# Patient Record
Sex: FEMALE | Race: WHITE | NOT HISPANIC OR LATINO | Employment: OTHER | URBAN - METROPOLITAN AREA
[De-identification: names, ages, dates, MRNs, and addresses within clinical notes are randomized per-mention and may not be internally consistent; named-entity substitution may affect disease eponyms.]

---

## 2017-03-30 ENCOUNTER — RADIANT APPOINTMENT (OUTPATIENT)
Dept: MAMMOGRAPHY | Facility: CLINIC | Age: 82
End: 2017-03-30
Attending: PHYSICIAN ASSISTANT

## 2017-03-30 ENCOUNTER — ONCOLOGY VISIT (OUTPATIENT)
Dept: ONCOLOGY | Facility: CLINIC | Age: 82
End: 2017-03-30
Attending: PHYSICIAN ASSISTANT
Payer: COMMERCIAL

## 2017-03-30 VITALS
OXYGEN SATURATION: 95 % | DIASTOLIC BLOOD PRESSURE: 74 MMHG | SYSTOLIC BLOOD PRESSURE: 134 MMHG | HEART RATE: 60 BPM | TEMPERATURE: 98.7 F

## 2017-03-30 DIAGNOSIS — C50.912 MALIGNANT NEOPLASM OF LEFT FEMALE BREAST, UNSPECIFIED SITE OF BREAST: Primary | ICD-10-CM

## 2017-03-30 DIAGNOSIS — N63.20 LEFT BREAST MASS: ICD-10-CM

## 2017-03-30 DIAGNOSIS — C50.912 MALIGNANT NEOPLASM OF LEFT FEMALE BREAST, UNSPECIFIED SITE OF BREAST: ICD-10-CM

## 2017-03-30 PROCEDURE — 99212 OFFICE O/P EST SF 10 MIN: CPT | Mod: ZF

## 2017-03-30 PROCEDURE — 99214 OFFICE O/P EST MOD 30 MIN: CPT | Mod: ZP | Performed by: PHYSICIAN ASSISTANT

## 2017-03-30 ASSESSMENT — PAIN SCALES - GENERAL: PAINLEVEL: NO PAIN (0)

## 2017-03-30 NOTE — MR AVS SNAPSHOT
"              After Visit Summary   3/30/2017    Buffy Oconnell    MRN: [id number]           Patient Information     Date Of Birth          1934        Visit Information        Provider Department      3/30/2017 12:30 PM Belkys Che PA-C [facility]        Today's Diagnoses     Malignant neoplasm of left female breast, unspecified site of breast (H)    -  1    Left breast mass           Follow-ups after your visit        Who to contact     If you have questions or need follow up information about today's clinic visit or your schedule please contact [facility] directly at [phone].  Normal or non-critical lab and imaging results will be communicated to you by [employer] teahart, letter or phone within 4 business days after the clinic has received the results. If you do not hear from us within 7 days, please contact the clinic through [employer] te[employer]art or phone. If you have a critical or abnormal lab result, we will notify you by phone as soon as possible.  Submit refill requests through [employer] or call your pharmacy and they will forward the refill request to us. Please allow 3 business days for your refill to be completed.          Additional Information About Your Visit        MyChart Information     [employer] lets you send messages to your doctor, view your test results, renew your prescriptions, schedule appointments and more. To sign up, go to [email or web address] . Click on \"Log in\" on the left side of the screen, which will take you to the Welcome page. Then click on \"Sign up Now\" on the right side of the page.     You will be asked to enter the access code listed below, as well as some personal information. Please follow the directions to create your username and password.     Your access code is: [email or web address]  Expires: 2017  6:30 AM     Your access code will  in 90 days. If you need help or a new code, please call your [place] clinic or [phone].        Care " EveryWhere ID     This is your Care EveryWhere ID. This could be used by other organizations to access your [place] medical records  [id number]        Your Vitals Were     Pulse Temperature Pulse Oximetry             60 98.7  F (37.1  C) (Oral) 95%          Blood Pressure from Last 3 Encounters:   03/30/17 134/74   02/16/16 143/85   02/16/15 128/68    Weight from Last 3 Encounters:   02/16/16 78.1 kg (172 lb 3.2 oz)   02/16/15 79.7 kg (175 lb 11.2 oz)   08/21/14 78.7 kg (173 lb 9.6 oz)               Primary Care Provider Office Phone # Fax #    Nely Sanchez [phone]       [facility] [address] [facility] [address]        Thank you!     Thank you for choosing [facility]  for your care. Our goal is always to provide you with excellent care. Hearing back from our patients is one way we can continue to improve our services. Please take a few minutes to complete the written survey that you may receive in the mail after your visit with us. Thank you!             Your Updated Medication List - Protect others around you: Learn how to safely use, store and throw away your medicines at www.disposemymeds.org.          This list is accurate as of: 3/30/17 11:59 PM.  Always use your most recent med list.                   Brand Name Dispense Instructions for use    aspirin 81 MG tablet      Take 1 tablet by mouth daily.       CALCIUM CITRATE + D PO      Take 2 tablets by mouth daily.       FLUoxetine 10 MG tablet    PROzac     Take 1 tablet by mouth daily. As directed.       losartan 100 MG tablet    COZAAR     Take 100 mg by mouth daily.       metoprolol 50 MG tablet    LOPRESSOR     Take 1 tablet by mouth 3 times daily. For blood pressure.       OMEGA 3 PO      Take 3 capsules by mouth daily.       UNABLE TO FIND      Nutriferon CAPS: 2 capsules daily       VITAMIN D PO      Take tablet -  2000 IU every day.

## 2017-03-30 NOTE — LETTER
3/30/2017    RE: Buffy Oconnell  [address] [address] [facility] [id number]       DIAGNOSIS:  Stage I (T1 N0 M0) infiltrating ductal carcinoma of the left breast, ER/MI negative, HER2 positive.  Ms. Oconnell had a bilateral screening mammogram on 01/03/2007. This noted a mass in her left breast. She had a left diagnostic mammogram with ultrasound on 01/19/2007. Ultrasound revealed a lobulated, hypoechoic mass, 0.9 x 0.9 x 0.8 cm. Biopsy of the left breast on 01/19/2007 revealed infiltrating ductal carcinoma, [place] grade 3. There was associated DCIS. ER/MI was negative. HER2 was positive. Left lumpectomy with sentinel lymph node biopsy was performed on 03/07/2007. This revealed infiltrating ductal carcinoma, 1.2 x 0.9 x 1.1 cm. This was a grade 3. There was associated DCIS. Margins were clear. Zero out of 2 lymph nodes were positive. She was diagnosed with a stage I (T1 N0 M0) left breast cancer. She completed 6 cycles of TCH (Taxotere, carboplatin and Herceptin) from 04/09/2007 until 07/23/2007. She went on to continue with the Herceptin from 08/24/2007 until 02/01/2008. There was a dose held secondary to a declining EF, but this resolved. She completed left breast radiation with 6440 cGy from 08/15/2007 until 10/04/2007.     CANCER TREATMENT SUMMARY:  *Bilateral screening mammogram on 01/03/2007. This noted a mass in her left breast.   *Left diagnostic mammogram with ultrasound on 01/19/2007. Ultrasound revealed a lobulated, hypoechoic mass, 0.9 x 0.9 x 0.8 cm.   *Biopsy of the left breast on 01/19/2007 revealed infiltrating ductal carcinoma, [place] grade 3. There was associated DCIS. ER/MI was negative. HER2 was positive.   *Left lumpectomy with sentinel lymph node biopsy was performed on 03/07/2007. This revealed infiltrating ductal carcinoma, 1.2 x 0.9 x 1.1 cm. This was a grade 3. There was associated DCIS. Margins were clear. Zero out of 2 lymph nodes were positive.   *6 cycles of TCH (Taxotere, carboplatin and  Herceptin) from 04/09/2007 until 07/23/2007.   *She went on to continue with the Herceptin from 08/24/2007 until 02/01/2008. There was a dose held secondary to a declining EF, but this resolved.   *Left breast radiation with 6440 cGy from 08/15/2007 until 10/04/2007.     INTERVAL HISTORY:  Ms. Oconnell comes in to the clinic today for an unscheduled visit secondary to a new onset left breast mass.  She is also due for her yearly followup.  She states that on 03/22 her  was diagnosed with Alzheimer's.  Within a couple of days, she noticed a lump just above her incision on the left breast.  She has noted no change in size since she has noticed the lump.  She does have sensitivity to touch in the area.  There are no other masses noted.  She had her last mammogram about a year ago.  She does seem a bit overwhelmed today given her 's new Alzheimer's diagnosis.  She will begin with an Alzheimer's support group near her home.  She otherwise is eating and drinking well.  She denies any chest pain or shortness of breath.  She has a chronic cough which is unchanged.  She denies any nausea, vomiting, abdominal pain, change of bowel habits, blood in the stools, change in urination, hearing loss or tinnitus.  She continues to have difficulty with her knees bilaterally and gets injections.  Her exercise is limited secondary to the knee pain.     MEDICATIONS:   Current Outpatient Prescriptions   Medication Sig Dispense Refill     losartan (COZAAR) 100 MG tablet Take 100 mg by mouth daily.       UNABLE TO FIND Nutriferon CAPS: 2 capsules daily       Omega-3 Fatty Acids (OMEGA 3 PO) Take 3 capsules by mouth daily.       aspirin 81 MG tablet Take 1 tablet by mouth daily.       Calcium Citrate-Vitamin D (CALCIUM CITRATE + D PO) Take 2 tablets by mouth daily.       FLUoxetine (PROZAC) 10 MG tablet Take 1 tablet by mouth daily. As directed.        metoprolol (LOPRESSOR) 50 MG tablet Take 1 tablet by mouth 3 times daily. For  blood pressure.        VITAMIN D PO Take tablet -  2000 IU every day.            ALLERGIES:    Allergies   Allergen Reactions     Darvocet [Propoxyphene N-Apap]      Darvocet-N 100 tabs     Hydrochlorothiazide W/Triamterene      PN: LW Reaction: HIVES     Macrodantin [Nitrofurantoin]      caps     Sulfa Drugs      Sulfamethoxazole-Trimethoprim      PN: LW Reaction: HIVES       PHYSICAL EXAMINATION:  Vitals: /74  Pulse 60  Temp 98.7  F (37.1  C) (Oral)  SpO2 95%  GENERAL:  A pleasant person in no acute distress.   HEENT:  Sclerae are nonicteric.    NECK:  Supple.   LYMPH NODES:  No peripheral lymphadenopathy noted in the axillary, supraclavicular, or cervical regions.   LUNGS:  Clear to auscultation bilaterally.   HEART:  Regular rate and rhythm, with no murmur appreciated.   ABDOMEN:  Bowel sounds are active.  Soft and nontender.  No hepatosplenomegaly or other masses appreciated.  LOWER EXTREMITIES:  Without pitting edema to the knees bilaterally.   NEUROLOGICAL:  Alert/orientated/able to answer all questions.  CN grossly intact.  BREAST: Right breast is normal to inspection. No dominant masses. Left breast notable for the well healed surgical incision from the 10:00 to 2:00 position near the areola.  Pea sized mass is just superior to the medial aspect of the incision.  No other masses.     RADIOLOGY:  Bilateral mammogram and Left breast US today: preliminary report, mass is fat necrosis.    IMPRESSION/PLAN:   1. Stage I (T1 N0 M0) infiltrating ductal carcinoma of the left breast, ER/OK negative, HER2 positive. She was treated as above with surgery, chemotherapy and radiation.  She has a pea sized mass near the incision of the left breast.  Preliminary breast imaging report shows no concern for cancer, but fat necrosis.  I am awaiting the final report at this time.  She will continue to follow with me in the Cancer Survivorship Program and I will see her on a yearly basis. She had discussions about  mammograms with Dr. Lora and is in agreement to continue obtaining mammograms every other year, given her age of 82.   2. Peripheral neuropathy. Ms. Oconnell states that she had no peripheral neuropathy while undergoing the chemotherapy. Peripheral neuropathy will not come as a late effect even given exposure to the chemotherapy.   3. Kidney complications. Carboplatin can cause damage to the kidney function. This is uncertain in the adult population, as this is not a well understood late effect. Creatinines obtained in the past have been within normal limits.   4. Ototoxicity. Carboplatin has the potential of causing hearing loss and tinnitus, both of which Ms. Oconnell denies at this time.   5. Elevated cholesterol levels. People exposed to carboplatin may develop elevated cholesterol at an early age. She is having her lipid profile checked through her primary care provider.   6. Bone health. The patient had a DEXA scan in 2010 which was consistent with low bone density. She will continue to follow with her primary care provider on further bone densities. She should consume calcium 3171-8774 mg per day along with vitamin D.   7. Cardiac risk with Herceptin. There are no late effects that are seen with the Herceptin with regards to heart failure or declining EF.   8. Cognitive changes. At times, Ms. Oconnell states that she has had some difficulty finding words, improved at this time.  9. Sexuality concerns. Ms. Oconnell denies any vaginal dryness. She is currently not sexually active.   10. Lymphedema risk. She denies any swelling in her left arm. Should she notice any edema in her arm, she should contact the clinic for a Lymphedema referral.   11. Cardiovascular toxicity. Given the left-sided radiation, she is at risk for premature coronary artery disease, hypertension, scarring, heart failure, and myocardial infarction. She had an echocardiogram in 2010 with an EF at that time of 55% to 60%. I do not believe that she needs  routine echocardiograms, but if she has any new cardiac symptoms we will consider a followup echocardiogram.   12. Other radiation side effects. Radiation would have included the lungs. This can lead to scarring and restrictive/obstructive lung disease. Given that she is asymptomatic, I do not recommend any routine imaging to look for potential radiation side effects. She should receive the annual influenza vaccine. She has already received the pneumococcal vaccine. The bones may be at risk for fractures, and she will contact us with any pain in that area. She should watch for any new lesions in the area of the skin of the radiation and have them evaluated.   13. Cancer screening.   She should undergo cancer screening for women of her age group. At the age of 81, she no longer needs cervical cancer screening. She had a hysterectomy at the age of 29 for uterine prolapse. She states she did have routine colonoscopies but was told by the last gastroenterologist that colonoscopies are not needed now at her age. She should have a colonoscopy if she has any change in bowel habits or blood in the stools. She should limit her sun exposure and use sunscreen.   14. Healthy lifestyle. She should refrain from tobacco. She should try to maintain a healthy weight with a BMI between 20 and 25. She should try to exercise at least 150 minutes of moderate exercise per week, currently limited at this time given her bilateral knee pain.  Her diet should include low fats. She should have regular checkups by her primary care to include screening of cholesterol, blood pressure and glucose. She should receive the annual flu vaccine. She has already received the pneumococcal vaccine. She should see her eye doctor every 2 years. She should see her dentist once a year.       If there are no interval concerns, Ms. Oconnell will follow up in the Cancer Survivorship Program in a year.     ADDENDUM:  Bilateral mammogram and US today:  Final report,  benign findings.  Area on exam is fat necrosis.      Belkys Che PA-C

## 2017-03-30 NOTE — PROGRESS NOTES
DIAGNOSIS:  Stage I (T1 N0 M0) infiltrating ductal carcinoma of the left breast, ER/VT negative, HER2 positive.  Ms. Oconnell had a bilateral screening mammogram on 01/03/2007. This noted a mass in her left breast. She had a left diagnostic mammogram with ultrasound on 01/19/2007. Ultrasound revealed a lobulated, hypoechoic mass, 0.9 x 0.9 x 0.8 cm. Biopsy of the left breast on 01/19/2007 revealed infiltrating ductal carcinoma, [place] grade 3. There was associated DCIS. ER/VT was negative. HER2 was positive. Left lumpectomy with sentinel lymph node biopsy was performed on 03/07/2007. This revealed infiltrating ductal carcinoma, 1.2 x 0.9 x 1.1 cm. This was a grade 3. There was associated DCIS. Margins were clear. Zero out of 2 lymph nodes were positive. She was diagnosed with a stage I (T1 N0 M0) left breast cancer. She completed 6 cycles of TCH (Taxotere, carboplatin and Herceptin) from 04/09/2007 until 07/23/2007. She went on to continue with the Herceptin from 08/24/2007 until 02/01/2008. There was a dose held secondary to a declining EF, but this resolved. She completed left breast radiation with 6440 cGy from 08/15/2007 until 10/04/2007.     CANCER TREATMENT SUMMARY:  *Bilateral screening mammogram on 01/03/2007. This noted a mass in her left breast.   *Left diagnostic mammogram with ultrasound on 01/19/2007. Ultrasound revealed a lobulated, hypoechoic mass, 0.9 x 0.9 x 0.8 cm.   *Biopsy of the left breast on 01/19/2007 revealed infiltrating ductal carcinoma, [place] grade 3. There was associated DCIS. ER/VT was negative. HER2 was positive.   *Left lumpectomy with sentinel lymph node biopsy was performed on 03/07/2007. This revealed infiltrating ductal carcinoma, 1.2 x 0.9 x 1.1 cm. This was a grade 3. There was associated DCIS. Margins were clear. Zero out of 2 lymph nodes were positive.   *6 cycles of TCH (Taxotere, carboplatin and Herceptin) from 04/09/2007 until 07/23/2007.   *She went on to continue  with the Herceptin from 08/24/2007 until 02/01/2008. There was a dose held secondary to a declining EF, but this resolved.   *Left breast radiation with 6440 cGy from 08/15/2007 until 10/04/2007.     INTERVAL HISTORY:  Ms. Oconnell comes in to the clinic today for an unscheduled visit secondary to a new onset left breast mass.  She is also due for her yearly followup.  She states that on 03/22 her  was diagnosed with Alzheimer's.  Within a couple of days, she noticed a lump just above her incision on the left breast.  She has noted no change in size since she has noticed the lump.  She does have sensitivity to touch in the area.  There are no other masses noted.  She had her last mammogram about a year ago.  She does seem a bit overwhelmed today given her 's new Alzheimer's diagnosis.  She will begin with an Alzheimer's support group near her home.  She otherwise is eating and drinking well.  She denies any chest pain or shortness of breath.  She has a chronic cough which is unchanged.  She denies any nausea, vomiting, abdominal pain, change of bowel habits, blood in the stools, change in urination, hearing loss or tinnitus.  She continues to have difficulty with her knees bilaterally and gets injections.  Her exercise is limited secondary to the knee pain.     MEDICATIONS:   Current Outpatient Prescriptions   Medication Sig Dispense Refill     losartan (COZAAR) 100 MG tablet Take 100 mg by mouth daily.       UNABLE TO FIND Nutriferon CAPS: 2 capsules daily       Omega-3 Fatty Acids (OMEGA 3 PO) Take 3 capsules by mouth daily.       aspirin 81 MG tablet Take 1 tablet by mouth daily.       Calcium Citrate-Vitamin D (CALCIUM CITRATE + D PO) Take 2 tablets by mouth daily.       FLUoxetine (PROZAC) 10 MG tablet Take 1 tablet by mouth daily. As directed.        metoprolol (LOPRESSOR) 50 MG tablet Take 1 tablet by mouth 3 times daily. For blood pressure.        VITAMIN D PO Take tablet -  2000 IU every day.             ALLERGIES:    Allergies   Allergen Reactions     Darvocet [Propoxyphene N-Apap]      Darvocet-N 100 tabs     Hydrochlorothiazide W/Triamterene      PN: LW Reaction: HIVES     Macrodantin [Nitrofurantoin]      caps     Sulfa Drugs      Sulfamethoxazole-Trimethoprim      PN: LW Reaction: HIVES       PHYSICAL EXAMINATION:  Vitals: /74  Pulse 60  Temp 98.7  F (37.1  C) (Oral)  SpO2 95%  GENERAL:  A pleasant person in no acute distress.   HEENT:  Sclerae are nonicteric.    NECK:  Supple.   LYMPH NODES:  No peripheral lymphadenopathy noted in the axillary, supraclavicular, or cervical regions.   LUNGS:  Clear to auscultation bilaterally.   HEART:  Regular rate and rhythm, with no murmur appreciated.   ABDOMEN:  Bowel sounds are active.  Soft and nontender.  No hepatosplenomegaly or other masses appreciated.  LOWER EXTREMITIES:  Without pitting edema to the knees bilaterally.   NEUROLOGICAL:  Alert/orientated/able to answer all questions.  CN grossly intact.  BREAST: Right breast is normal to inspection. No dominant masses. Left breast notable for the well healed surgical incision from the 10:00 to 2:00 position near the areola.  Pea sized mass is just superior to the medial aspect of the incision.  No other masses.     RADIOLOGY:  Bilateral mammogram and Left breast US today: preliminary report, mass is fat necrosis.    IMPRESSION/PLAN:   1. Stage I (T1 N0 M0) infiltrating ductal carcinoma of the left breast, ER/MT negative, HER2 positive. She was treated as above with surgery, chemotherapy and radiation.  She has a pea sized mass near the incision of the left breast.  Preliminary breast imaging report shows no concern for cancer, but fat necrosis.  I am awaiting the final report at this time.  She will continue to follow with me in the Cancer Survivorship Program and I will see her on a yearly basis. She had discussions about mammograms with Dr. Lora and is in agreement to continue obtaining mammograms  every other year, given her age of 82.   2. Peripheral neuropathy. Ms. Oconnell states that she had no peripheral neuropathy while undergoing the chemotherapy. Peripheral neuropathy will not come as a late effect even given exposure to the chemotherapy.   3. Kidney complications. Carboplatin can cause damage to the kidney function. This is uncertain in the adult population, as this is not a well understood late effect. Creatinines obtained in the past have been within normal limits.   4. Ototoxicity. Carboplatin has the potential of causing hearing loss and tinnitus, both of which Ms. Oconnell denies at this time.   5. Elevated cholesterol levels. People exposed to carboplatin may develop elevated cholesterol at an early age. She is having her lipid profile checked through her primary care provider.   6. Bone health. The patient had a DEXA scan in 2010 which was consistent with low bone density. She will continue to follow with her primary care provider on further bone densities. She should consume calcium 9036-1093 mg per day along with vitamin D.   7. Cardiac risk with Herceptin. There are no late effects that are seen with the Herceptin with regards to heart failure or declining EF.   8. Cognitive changes. At times, Ms. Oconnell states that she has had some difficulty finding words, improved at this time.  9. Sexuality concerns. Ms. Oconnell denies any vaginal dryness. She is currently not sexually active.   10. Lymphedema risk. She denies any swelling in her left arm. Should she notice any edema in her arm, she should contact the clinic for a Lymphedema referral.   11. Cardiovascular toxicity. Given the left-sided radiation, she is at risk for premature coronary artery disease, hypertension, scarring, heart failure, and myocardial infarction. She had an echocardiogram in 2010 with an EF at that time of 55% to 60%. I do not believe that she needs routine echocardiograms, but if she has any new cardiac symptoms we will  consider a followup echocardiogram.   12. Other radiation side effects. Radiation would have included the lungs. This can lead to scarring and restrictive/obstructive lung disease. Given that she is asymptomatic, I do not recommend any routine imaging to look for potential radiation side effects. She should receive the annual influenza vaccine. She has already received the pneumococcal vaccine. The bones may be at risk for fractures, and she will contact us with any pain in that area. She should watch for any new lesions in the area of the skin of the radiation and have them evaluated.   13. Cancer screening.   She should undergo cancer screening for women of her age group. At the age of 81, she no longer needs cervical cancer screening. She had a hysterectomy at the age of 29 for uterine prolapse. She states she did have routine colonoscopies but was told by the last gastroenterologist that colonoscopies are not needed now at her age. She should have a colonoscopy if she has any change in bowel habits or blood in the stools. She should limit her sun exposure and use sunscreen.   14. Healthy lifestyle. She should refrain from tobacco. She should try to maintain a healthy weight with a BMI between 20 and 25. She should try to exercise at least 150 minutes of moderate exercise per week, currently limited at this time given her bilateral knee pain.  Her diet should include low fats. She should have regular checkups by her primary care to include screening of cholesterol, blood pressure and glucose. She should receive the annual flu vaccine. She has already received the pneumococcal vaccine. She should see her eye doctor every 2 years. She should see her dentist once a year.       If there are no interval concerns, Ms. Oconnell will follow up in the Cancer Survivorship Program in a year.     ADDENDUM:  Bilateral mammogram and US today:  Final report, benign findings.  Area on exam is fat necrosis.

## 2017-03-30 NOTE — NURSING NOTE
"Buffy Oconnell is a 82 year old female who presents for:  Chief Complaint   Patient presents with     Oncology Clinic Visit     Breast cancer, left breast (H)        Initial Vitals:  /74  Pulse 60  Temp 98.7  F (37.1  C) (Oral)  SpO2 95% Estimated body mass index is 31.5 kg/(m^2) as calculated from the following:    Height as of 8/21/14: 1.575 m (5' 2\").    Weight as of 2/16/16: 78.1 kg (172 lb 3.2 oz).. There is no height or weight on file to calculate BSA. BP completed using cuff size: regular  No Pain (0) No LMP recorded. Patient has had a hysterectomy. Allergies and medications reviewed.     Medications: Medication refills not needed today.  Pharmacy name entered into [facility]: [facility] - [place], [place] - [address] [place] [address]    Comments: Pt just found our her  has been dx with Alzheimer's.    6 minutes for nursing intake (face to face time)   Charissa King CMA        "

## 2018-04-05 ENCOUNTER — ONCOLOGY SURVIVORSHIP VISIT (OUTPATIENT)
Dept: ONCOLOGY | Facility: CLINIC | Age: 83
End: 2018-04-05
Attending: PHYSICIAN ASSISTANT
Payer: MEDICARE

## 2018-04-05 ENCOUNTER — RADIANT APPOINTMENT (OUTPATIENT)
Dept: MAMMOGRAPHY | Facility: CLINIC | Age: 83
End: 2018-04-05
Attending: PHYSICIAN ASSISTANT
Payer: COMMERCIAL

## 2018-04-05 VITALS
OXYGEN SATURATION: 98 % | DIASTOLIC BLOOD PRESSURE: 68 MMHG | HEIGHT: 62 IN | SYSTOLIC BLOOD PRESSURE: 139 MMHG | TEMPERATURE: 97 F | HEART RATE: 67 BPM | RESPIRATION RATE: 16 BRPM

## 2018-04-05 DIAGNOSIS — C50.912 MALIGNANT NEOPLASM OF LEFT BREAST IN FEMALE, ESTROGEN RECEPTOR NEGATIVE, UNSPECIFIED SITE OF BREAST (H): Primary | ICD-10-CM

## 2018-04-05 DIAGNOSIS — Z17.1 MALIGNANT NEOPLASM OF LEFT BREAST IN FEMALE, ESTROGEN RECEPTOR NEGATIVE, UNSPECIFIED SITE OF BREAST (H): Primary | ICD-10-CM

## 2018-04-05 DIAGNOSIS — C50.912 MALIGNANT NEOPLASM OF LEFT FEMALE BREAST (H): ICD-10-CM

## 2018-04-05 PROCEDURE — 99213 OFFICE O/P EST LOW 20 MIN: CPT | Mod: ZP | Performed by: PHYSICIAN ASSISTANT

## 2018-04-05 PROCEDURE — G0463 HOSPITAL OUTPT CLINIC VISIT: HCPCS | Mod: ZF

## 2018-04-05 RX ORDER — LOVASTATIN 40 MG
TABLET ORAL
COMMUNITY
Start: 2016-06-01

## 2018-04-05 ASSESSMENT — PAIN SCALES - GENERAL: PAINLEVEL: NO PAIN (0)

## 2018-04-05 NOTE — NURSING NOTE
"Oncology Rooming Note    April 5, 2018 1:01 PM   Buffy Oconnell is a 83 year old female who presents for:    Chief Complaint   Patient presents with     Oncology Clinic Visit     Return: Breast ca      Initial Vitals: /68 (BP Location: Right arm, Patient Position: Chair, Cuff Size: Adult Large)  Pulse 67  Temp 97  F (36.1  C) (Oral)  Resp 16  Ht 1.575 m (5' 2.01\")  SpO2 98% Estimated body mass index is 31.5 kg/(m^2) as calculated from the following:    Height as of 8/21/14: 1.575 m (5' 2\").    Weight as of 2/16/16: 78.1 kg (172 lb 3.2 oz). There is no height or weight on file to calculate BSA.  No Pain (0) Comment: Data Unavailable   No LMP recorded. Patient has had a hysterectomy.  Allergies reviewed: YES  Medications reviewed: YES    Medications: Medication refills not needed today.  Pharmacy name entered into [employer]: [facility] - [place], [place] - [address] [place] [address]    Clinical concerns: no new concerns.  Pt received flu shot elsewhere. See Immunizations   Completed Fall Risk Screening and updated Health Maintenance .  See screenings          6 minutes for nursing intake (face to face time)     Dian Orourke CMA                "

## 2018-04-05 NOTE — PROGRESS NOTES
DIAGNOSIS:  CANCER SURVIVORSHIP PROGRAM  Stage I (T1 N0 M0) infiltrating ductal carcinoma of the left breast, ER/OK negative, HER2 positive.  Ms. Oconnell had a bilateral screening mammogram on 01/03/2007. This noted a mass in her left breast. She had a left diagnostic mammogram with ultrasound on 01/19/2007. Ultrasound revealed a lobulated, hypoechoic mass, 0.9 x 0.9 x 0.8 cm. Biopsy of the left breast on 01/19/2007 revealed infiltrating ductal carcinoma, Jose Luis grade 3. There was associated DCIS. ER/OK was negative. HER2 was positive. Left lumpectomy with sentinel lymph node biopsy was performed on 03/07/2007. This revealed infiltrating ductal carcinoma, 1.2 x 0.9 x 1.1 cm. This was a grade 3. There was associated DCIS. Margins were clear. Zero out of 2 lymph nodes were positive. She was diagnosed with a stage I (T1 N0 M0) left breast cancer. She completed 6 cycles of TCH (Taxotere, carboplatin and Herceptin) from 04/09/2007 until 07/23/2007. She went on to continue with the Herceptin from 08/24/2007 until 02/01/2008. There was a dose held secondary to a declining EF, but this resolved. She completed left breast radiation with 6440 cGy from 08/15/2007 until 10/04/2007.      CANCER TREATMENT SUMMARY:  *Bilateral screening mammogram on 01/03/2007. This noted a mass in her left breast.   *Left diagnostic mammogram with ultrasound on 01/19/2007. Ultrasound revealed a lobulated, hypoechoic mass, 0.9 x 0.9 x 0.8 cm.   *Biopsy of the left breast on 01/19/2007 revealed infiltrating ductal carcinoma, Jose Luis grade 3. There was associated DCIS. ER/OK was negative. HER2 was positive.   *Left lumpectomy with sentinel lymph node biopsy was performed on 03/07/2007. This revealed infiltrating ductal carcinoma, 1.2 x 0.9 x 1.1 cm. This was a grade 3. There was associated DCIS. Margins were clear. Zero out of 2 lymph nodes were positive.   *6 cycles of TCH (Taxotere, carboplatin and Herceptin) from 04/09/2007 until 07/23/2007.  "  *She went on to continue with the Herceptin from 08/24/2007 until 02/01/2008. There was a dose held secondary to a declining EF, but this resolved.   *Left breast radiation with 6440 cGy from 08/15/2007 until 10/04/2007.     INTERVAL HISTORY:  Ms. Oconnell returns to clinic today for follow-up of her breast carcinoma.  She is doing fairly well at this time.  She does note her continued arthritis pain which is unchanged.  She continues to be active in taking care of her  who has Alzheimer's.  She is not able to find time to do a formal exercise program.  She is eating and drinking well.  She denies chest pain, shortness of breath, cough, nausea, vomiting, abdominal pain, new headaches, lymphedema.    MEDICATIONS:   Current Outpatient Prescriptions   Medication Sig Dispense Refill     lovastatin (MEVACOR) 40 MG tablet TAKE 1 TABLET DAILY       losartan (COZAAR) 100 MG tablet Take 100 mg by mouth daily.       UNABLE TO FIND Nutriferon CAPS:  capsules daily       aspirin 81 MG tablet Take 1 tablet by mouth daily.       FLUoxetine (PROZAC) 10 MG tablet Take 1 tablet by mouth daily. As directed.        metoprolol (LOPRESSOR) 50 MG tablet Take 1 tablet by mouth 3 times daily. For blood pressure.        VITAMIN D PO Take tablet -  2000 IU every day.        Omega-3 Fatty Acids (OMEGA 3 PO) Take 3 capsules by mouth daily.       Calcium Citrate-Vitamin D (CALCIUM CITRATE + D PO) Take 2 tablets by mouth daily.           ALLERGIES:    Allergies   Allergen Reactions     Darvocet [Propoxyphene N-Apap]      Darvocet-N 100 tabs     Hydrochlorothiazide W/Triamterene      PN: LW Reaction: HIVES     Macrodantin [Nitrofurantoin]      caps     Sulfa Drugs      Sulfamethoxazole-Trimethoprim      PN: LW Reaction: HIVES       PHYSICAL EXAMINATION:  Vitals: /68 (BP Location: Right arm, Patient Position: Chair, Cuff Size: Adult Large)  Pulse 67  Temp 97  F (36.1  C) (Oral)  Resp 16  Ht 1.575 m (5' 2.01\")  SpO2 98%  GENERAL: "  A pleasant person in no acute distress.   HEENT:  Sclerae are nonicteric.   NECK:  Supple.   LYMPH NODES:  No peripheral lymphadenopathy noted in the axillary, supraclavicular, or cervical regions.   LUNGS:  Clear to auscultation bilaterally.   HEART:  Regular rate and rhythm, with no murmur appreciated.   ABDOMEN:  Bowel sounds are active.  Soft and nontender.  No hepatosplenomegaly or other masses appreciated.  LOWER EXTREMITIES:  Without pitting edema to the knees bilaterally.   NEUROLOGICAL:  Alert/orientated/able to answer all questions.  CN grossly intact.  PSYCH:  Normal affect.  SKIN:  No suspicious lesions on areas of exposed skin.  BREAST:  Right breast normal to inspection, no masses.  Left breast, well healed surgical incision 10-2 oclock. Pea sized mass superior to the medial aspect on the incision, stable.  No other masses.       RADIOLOGY:  Examination: MA diagnostic bilateral tomosynthesis, 4/5/2018 2:28 PM   Impression: BI-RADS CATEGORY: 2 - Benign Finding(s).  Recommended Follow-up: Annual Mammography.    IMPRESSION/PLAN:   1. Stage I (T1 N0 M0) infiltrating ductal carcinoma of the left breast, ER/MI negative, HER2 positive. She was treated as above with surgery, chemotherapy and radiation.  She to be release from the Cancer Survivorship Program and will follow up with her PCP.   She had discussions about mammograms with Dr. Lora and is in agreement to continue obtaining mammograms every other year, given her age of 83.   Mammogram from today was fine.  She will discuss further imaging with her PCP.  2. Peripheral neuropathy. Ms. Oconnell states that she had no peripheral neuropathy while undergoing the chemotherapy. Peripheral neuropathy will not come as a late effect even given exposure to the chemotherapy.   3. Kidney complications. Carboplatin can cause damage to the kidney function. This is uncertain in the adult population, as this is not a well understood late effect. Creatinines obtained in  the past have been within normal limits.   4. Ototoxicity. Carboplatin has the potential of causing hearing loss and tinnitus, both of which Ms. Oconnell denies at this time.  No concern's today.  5. Elevated cholesterol levels. People exposed to carboplatin may develop elevated cholesterol at an early age. She is having her lipid profile checked through her primary care provider.   6. Bone health. The patient had a DEXA scan in 2010 which was consistent with low bone density. She will continue to follow with her primary care provider on further bone densities. She should consume calcium 2380-5819 mg per day along with vitamin D.   7. Cardiac risk with Herceptin. There are no known late effects that are seen with the Herceptin with regards to heart failure or declining EF.   8. Lymphedema risk. She denies any swelling in her left arm. Should she notice any edema in her arm, she should contact the clinic for a Lymphedema referral.   9. Cardiovascular toxicity. Given the left-sided radiation, she is at risk for cardiac complications in the future.   She had an echocardiogram in 2010 with an EF at that time of 55% to 60%. I do not recommend routine echocardiograms, but if she has any new cardiac symptoms we will consider a followup echocardiogram.   10. Other radiation side effects. Radiation would have included the lungs. Given that she is asymptomatic, I do not recommend any routine imaging to look for potential radiation side effects. She should receive the annual influenza vaccine. She has already received the pneumococcal vaccine. The bones may be at risk for fractures, and she will contact us with any pain in that area. She should watch for any new lesions in the area of the skin of the radiation and have them evaluated.   11. Cancer screening.   She should undergo cancer screening for women of her age group. At the age of 83, she no longer needs cervical cancer screening. She had a hysterectomy at the age of 29 for  uterine prolapse. She states she did have routine colonoscopies but was told by the last gastroenterologist that colonoscopies are not needed now at her age.  She should limit her sun exposure and use sunscreen.   12. Healthy lifestyle. She should refrain from tobacco. She should try to maintain a healthy weight with a BMI between 20 and 25. She should try to exercise at least 150 minutes of moderate exercise per week.  Her diet should include low fats. She should have regular checkups by her primary care to include screening of cholesterol, blood pressure and glucose. She should receive the annual flu vaccine. She has already received the pneumococcal vaccine. She should see her eye doctor and dentist routinely.    Ms. Oconnell will return to the Cancer Survivorship program on a prn basis.

## 2018-04-05 NOTE — LETTER
4/5/2018      RE: Buffy Oconnell  [id number] [address] [facility] [address]       DIAGNOSIS:  CANCER SURVIVORSHIP PROGRAM  Stage I (T1 N0 M0) infiltrating ductal carcinoma of the left breast, ER/CA negative, HER2 positive.  Ms. Oconnell had a bilateral screening mammogram on 01/03/2007. This noted a mass in her left breast. She had a left diagnostic mammogram with ultrasound on 01/19/2007. Ultrasound revealed a lobulated, hypoechoic mass, 0.9 x 0.9 x 0.8 cm. Biopsy of the left breast on 01/19/2007 revealed infiltrating ductal carcinoma, Jose Luis grade 3. There was associated DCIS. ER/CA was negative. HER2 was positive. Left lumpectomy with sentinel lymph node biopsy was performed on 03/07/2007. This revealed infiltrating ductal carcinoma, 1.2 x 0.9 x 1.1 cm. This was a grade 3. There was associated DCIS. Margins were clear. Zero out of 2 lymph nodes were positive. She was diagnosed with a stage I (T1 N0 M0) left breast cancer. She completed 6 cycles of TCH (Taxotere, carboplatin and Herceptin) from 04/09/2007 until 07/23/2007. She went on to continue with the Herceptin from 08/24/2007 until 02/01/2008. There was a dose held secondary to a declining EF, but this resolved. She completed left breast radiation with 6440 cGy from 08/15/2007 until 10/04/2007.      CANCER TREATMENT SUMMARY:  *Bilateral screening mammogram on 01/03/2007. This noted a mass in her left breast.   *Left diagnostic mammogram with ultrasound on 01/19/2007. Ultrasound revealed a lobulated, hypoechoic mass, 0.9 x 0.9 x 0.8 cm.   *Biopsy of the left breast on 01/19/2007 revealed infiltrating ductal carcinoma, Jose Luis grade 3. There was associated DCIS. ER/CA was negative. HER2 was positive.   *Left lumpectomy with sentinel lymph node biopsy was performed on 03/07/2007. This revealed infiltrating ductal carcinoma, 1.2 x 0.9 x 1.1 cm. This was a grade 3. There was associated DCIS. Margins were clear. Zero out of 2 lymph nodes were positive.   *6 cycles of  TCH (Taxotere, carboplatin and Herceptin) from 04/09/2007 until 07/23/2007.   *She went on to continue with the Herceptin from 08/24/2007 until 02/01/2008. There was a dose held secondary to a declining EF, but this resolved.   *Left breast radiation with 6440 cGy from 08/15/2007 until 10/04/2007.     INTERVAL HISTORY:  Ms. Oconnell returns to clinic today for follow-up of her breast carcinoma.  She is doing fairly well at this time.  She does note her continued arthritis pain which is unchanged.  She continues to be active in taking care of her  who has Alzheimer's.  She is not able to find time to do a formal exercise program.  She is eating and drinking well.  She denies chest pain, shortness of breath, cough, nausea, vomiting, abdominal pain, new headaches, lymphedema.    MEDICATIONS:   Current Outpatient Prescriptions   Medication Sig Dispense Refill     lovastatin (MEVACOR) 40 MG tablet TAKE 1 TABLET DAILY       losartan (COZAAR) 100 MG tablet Take 100 mg by mouth daily.       UNABLE TO FIND Nutriferon CAPS:  capsules daily       aspirin 81 MG tablet Take 1 tablet by mouth daily.       FLUoxetine (PROZAC) 10 MG tablet Take 1 tablet by mouth daily. As directed.        metoprolol (LOPRESSOR) 50 MG tablet Take 1 tablet by mouth 3 times daily. For blood pressure.        VITAMIN D PO Take tablet -  2000 IU every day.        Omega-3 Fatty Acids (OMEGA 3 PO) Take 3 capsules by mouth daily.       Calcium Citrate-Vitamin D (CALCIUM CITRATE + D PO) Take 2 tablets by mouth daily.           ALLERGIES:    Allergies   Allergen Reactions     Darvocet [Propoxyphene N-Apap]      Darvocet-N 100 tabs     Hydrochlorothiazide W/Triamterene      PN: LW Reaction: HIVES     Macrodantin [Nitrofurantoin]      caps     Sulfa Drugs      Sulfamethoxazole-Trimethoprim      PN: LW Reaction: HIVES       PHYSICAL EXAMINATION:  Vitals: /68 (BP Location: Right arm, Patient Position: Chair, Cuff Size: Adult Large)  Pulse 67  Temp 97  " F (36.1  C) (Oral)  Resp 16  Ht 1.575 m (5' 2.01\")  SpO2 98%  GENERAL:  A pleasant person in no acute distress.   HEENT:  Sclerae are nonicteric.   NECK:  Supple.   LYMPH NODES:  No peripheral lymphadenopathy noted in the axillary, supraclavicular, or cervical regions.   LUNGS:  Clear to auscultation bilaterally.   HEART:  Regular rate and rhythm, with no murmur appreciated.   ABDOMEN:  Bowel sounds are active.  Soft and nontender.  No hepatosplenomegaly or other masses appreciated.  LOWER EXTREMITIES:  Without pitting edema to the knees bilaterally.   NEUROLOGICAL:  Alert/orientated/able to answer all questions.  CN grossly intact.  PSYCH:  Normal affect.  SKIN:  No suspicious lesions on areas of exposed skin.  BREAST:  Right breast normal to inspection, no masses.  Left breast, well healed surgical incision 10-2 oclock. Pea sized mass superior to the medial aspect on the incision, stable.  No other masses.       RADIOLOGY:  Examination: MA diagnostic bilateral tomosynthesis, 4/5/2018 2:28 PM   Impression: BI-RADS CATEGORY: 2 - Benign Finding(s).  Recommended Follow-up: Annual Mammography.    IMPRESSION/PLAN:   1. Stage I (T1 N0 M0) infiltrating ductal carcinoma of the left breast, ER/AL negative, HER2 positive. She was treated as above with surgery, chemotherapy and radiation.  She  to be release from the Cancer Survivorship Program and will follow up with her PCP.   She had discussions about mammograms with Dr. Lora and is in agreement to continue obtaining mammograms every other year, given her age of 83.   Mammogram from today was fine.  She will discuss further imaging with her PCP.  2. Peripheral neuropathy. Ms. Oconnell states that she had no peripheral neuropathy while undergoing the chemotherapy. Peripheral neuropathy will not come as a late effect even given exposure to the chemotherapy.   3. Kidney complications. Carboplatin can cause damage to the kidney function. This is uncertain in the adult " population, as this is not a well understood late effect. Creatinines obtained in the past have been within normal limits.   4. Ototoxicity. Carboplatin has the potential of causing hearing loss and tinnitus, both of which Ms. Oconnell denies at this time.  No concern's today.  5. Elevated cholesterol levels. People exposed to carboplatin may develop elevated cholesterol at an early age. She is having her lipid profile checked through her primary care provider.   6. Bone health. The patient had a DEXA scan in 2010 which was consistent with low bone density. She will continue to follow with her primary care provider on further bone densities. She should consume calcium 4862-7084 mg per day along with vitamin D.   7. Cardiac risk with Herceptin. There are no known late effects that are seen with the Herceptin with regards to heart failure or declining EF.   8. Lymphedema risk. She denies any swelling in her left arm. Should she notice any edema in her arm, she should contact the clinic for a Lymphedema referral.   9. Cardiovascular toxicity. Given the left-sided radiation, she is at risk for cardiac complications in the future.   She had an echocardiogram in 2010 with an EF at that time of 55% to 60%. I do not recommend routine echocardiograms, but if she has any new cardiac symptoms we will consider a followup echocardiogram.   10. Other radiation side effects. Radiation would have included the lungs. Given that she is asymptomatic, I do not recommend any routine imaging to look for potential radiation side effects. She should receive the annual influenza vaccine. She has already received the pneumococcal vaccine. The bones may be at risk for fractures, and she will contact us with any pain in that area. She should watch for any new lesions in the area of the skin of the radiation and have them evaluated.   11. Cancer screening.   She should undergo cancer screening for women of her age group. At the age of 83, she no  longer needs cervical cancer screening. She had a hysterectomy at the age of 29 for uterine prolapse. She states she did have routine colonoscopies but was told by the last gastroenterologist that colonoscopies are not needed now at her age.  She should limit her sun exposure and use sunscreen.   12. Healthy lifestyle. She should refrain from tobacco. She should try to maintain a healthy weight with a BMI between 20 and 25. She should try to exercise at least 150 minutes of moderate exercise per week.  Her diet should include low fats. She should have regular checkups by her primary care to include screening of cholesterol, blood pressure and glucose. She should receive the annual flu vaccine. She has already received the pneumococcal vaccine. She should see her eye doctor and dentist routinely.    Ms. Oconnell will return to the Cancer Survivorship program on a prn basis.    Belkys Che PA-C

## 2018-04-05 NOTE — MR AVS SNAPSHOT
"              After Visit Summary   2018    Buffy Oconnell    MRN: [id number]           Patient Information     Date Of Birth          1934        Visit Information        Provider Department      2018 1:15 PM Belkys Che PA-C [facility]        Today's Diagnoses     Malignant neoplasm of left breast in female, estrogen receptor negative, unspecified site of breast (H)    -  1       Follow-ups after your visit        Who to contact     If you have questions or need follow up information about today's clinic visit or your schedule please contact [facility] directly at [phone].  Normal or non-critical lab and imaging results will be communicated to you by [employer], letter or phone within 4 business days after the clinic has received the results. If you do not hear from us within 7 days, please contact the clinic through [employer] or phone. If you have a critical or abnormal lab result, we will notify you by phone as soon as possible.  Submit refill requests through [employer] or call your pharmacy and they will forward the refill request to us. Please allow 3 business days for your refill to be completed.          Additional Information About Your Visit        MyChart Information     [employer] lets you send messages to your doctor, view your test results, renew your prescriptions, schedule appointments and more. To sign up, go to [email or web address] . Click on \"Log in\" on the left side of the screen, which will take you to the Welcome page. Then click on \"Sign up Now\" on the right side of the page.     You will be asked to enter the access code listed below, as well as some personal information. Please follow the directions to create your username and password.     Your access code is: [email or web address]  Expires: 2018  6:30 AM     Your access code will  in 90 days. If you need help or a new code, please call your [place] clinic or [phone].      " "  Care EveryWhere ID     This is your Care EveryWhere ID. This could be used by other organizations to access your [place] medical records  [id number]        Your Vitals Were     Pulse Temperature Respirations Height Pulse Oximetry       67 97  F (36.1  C) (Oral) 16 1.575 m (5' 2.01\") 98%        Blood Pressure from Last 3 Encounters:   04/05/18 139/68   03/30/17 134/74   02/16/16 143/85    Weight from Last 3 Encounters:   02/16/16 78.1 kg (172 lb 3.2 oz)   02/16/15 79.7 kg (175 lb 11.2 oz)   08/21/14 78.7 kg (173 lb 9.6 oz)              Today, you had the following     No orders found for display       Primary Care Provider Office Phone # Fax #    Nely Sanchez [phone]       [facility] [address] [place] [id number]        Equal Access to Services     MISAEL SHIELDS : Hadii aad ku hadasho Soomaali, waaxda luqadaha, qaybta kaalmada adeegyada, waxay nuhain hayrennyn oziel rico . So [facility] [phone].    ATENCIÓN: Si edgarla español, tiene a thomas disposición servicios gratuitos de asistencia lingüística. Llame al [phone].    We comply with applicable federal civil rights laws and [place] laws. We do not discriminate on the basis of race, color, national origin, age, disability, sex, sexual orientation, or gender identity.            Thank you!     Thank you for choosing [facility]  for your care. Our goal is always to provide you with excellent care. Hearing back from our patients is one way we can continue to improve our services. Please take a few minutes to complete the written survey that you may receive in the mail after your visit with us. Thank you!             Your Updated Medication List - Protect others around you: Learn how to safely use, store and throw away your medicines at www.disposemymeds.org.          This list is accurate as of 4/5/18 11:59 PM.  Always use your most recent med list.                   Brand Name Dispense " Instructions for use Diagnosis    aspirin 81 MG tablet      Take 1 tablet by mouth daily.        CALCIUM CITRATE + D PO      Take 2 tablets by mouth daily.        FLUoxetine 10 MG tablet    PROzac     Take 1 tablet by mouth daily. As directed.        losartan 100 MG tablet    COZAAR     Take 100 mg by mouth daily.    Malignant neoplasm of breast (female), unspecified site       lovastatin 40 MG tablet    MEVACOR     TAKE 1 TABLET DAILY        metoprolol tartrate 50 MG tablet    LOPRESSOR     Take 1 tablet by mouth 3 times daily. For blood pressure.        OMEGA 3 PO      Take 3 capsules by mouth daily.        UNABLE TO FIND      Nutriferon CAPS:  capsules daily        VITAMIN D PO      Take tablet -  2000 IU every day.

## 2019-06-05 ENCOUNTER — HOSPITAL ENCOUNTER (OUTPATIENT)
Dept: MAMMOGRAPHY | Facility: CLINIC | Age: 84
Discharge: HOME | End: 2019-06-05
Attending: PHYSICIAN ASSISTANT | Admitting: PHYSICIAN ASSISTANT
Payer: MEDICARE

## 2019-06-05 DIAGNOSIS — Z12.31 VISIT FOR SCREENING MAMMOGRAM: ICD-10-CM

## 2019-06-05 PROCEDURE — 77063 BREAST TOMOSYNTHESIS BI: CPT

## 2020-07-16 ENCOUNTER — HOSPITAL ENCOUNTER (OUTPATIENT)
Dept: MAMMOGRAPHY | Facility: CLINIC | Age: 85
Discharge: HOME | End: 2020-07-16
Attending: FAMILY MEDICINE | Admitting: FAMILY MEDICINE
Payer: MEDICARE

## 2020-07-16 DIAGNOSIS — Z12.31 VISIT FOR SCREENING MAMMOGRAM: ICD-10-CM

## 2020-07-16 PROCEDURE — 77063 BREAST TOMOSYNTHESIS BI: CPT

## 2021-07-20 ENCOUNTER — HOSPITAL ENCOUNTER (OUTPATIENT)
Dept: MAMMOGRAPHY | Facility: CLINIC | Age: 86
Discharge: HOME | End: 2021-07-20
Attending: FAMILY MEDICINE | Admitting: FAMILY MEDICINE
Payer: MEDICARE

## 2021-07-20 DIAGNOSIS — Z12.31 VISIT FOR SCREENING MAMMOGRAM: ICD-10-CM

## 2021-07-20 PROCEDURE — 77063 BREAST TOMOSYNTHESIS BI: CPT

## 2021-09-06 DIAGNOSIS — Z11.59 ENCOUNTER FOR SCREENING FOR OTHER VIRAL DISEASES: ICD-10-CM

## 2021-10-05 ENCOUNTER — ALLIED HEALTH/NURSE VISIT (OUTPATIENT)
Dept: FAMILY MEDICINE | Facility: CLINIC | Age: 86
End: 2021-10-05
Payer: MEDICARE

## 2021-10-05 DIAGNOSIS — Z23 ENCOUNTER FOR IMMUNIZATION: Primary | ICD-10-CM

## 2021-10-05 PROCEDURE — 99207 PR NO CHARGE NURSE ONLY: CPT

## 2021-10-05 PROCEDURE — 0004A PR COVID VAC PFIZER DIL RECON 30 MCG/0.3 ML IM: CPT

## 2021-10-05 PROCEDURE — 91300 PR COVID VAC PFIZER DIL RECON 30 MCG/0.3 ML IM: CPT

## 2021-10-07 RX ORDER — FAMOTIDINE 10 MG
10 TABLET ORAL PRN
COMMUNITY

## 2021-10-07 RX ORDER — METOPROLOL SUCCINATE 50 MG/1
50 TABLET, EXTENDED RELEASE ORAL DAILY
COMMUNITY
Start: 2021-07-19

## 2021-10-07 RX ORDER — ACETAMINOPHEN 500 MG
500-1000 TABLET ORAL EVERY 6 HOURS PRN
COMMUNITY

## 2021-10-07 ASSESSMENT — MIFFLIN-ST. JEOR: SCORE: 1060.71

## 2021-10-07 NOTE — PLAN OF CARE
Patient states that primary MD told her she should have another echo done since previous one was 12/2019 but that it could be done after surgery. Dr. Armendariz MDA reviewed previous echo and gave approval to proceed without a repeat echo. Patient informed of above.

## 2021-10-08 ENCOUNTER — LAB (OUTPATIENT)
Dept: LAB | Facility: CLINIC | Age: 86
End: 2021-10-08
Payer: MEDICARE

## 2021-10-08 DIAGNOSIS — Z11.59 ENCOUNTER FOR SCREENING FOR OTHER VIRAL DISEASES: ICD-10-CM

## 2021-10-08 PROCEDURE — U0005 INFEC AGEN DETEC AMPLI PROBE: HCPCS

## 2021-10-08 PROCEDURE — U0003 INFECTIOUS AGENT DETECTION BY NUCLEIC ACID (DNA OR RNA); SEVERE ACUTE RESPIRATORY SYNDROME CORONAVIRUS 2 (SARS-COV-2) (CORONAVIRUS DISEASE [COVID-19]), AMPLIFIED PROBE TECHNIQUE, MAKING USE OF HIGH THROUGHPUT TECHNOLOGIES AS DESCRIBED BY CMS-2020-01-R: HCPCS

## 2021-10-08 NOTE — PHARMACY-ADMISSION MEDICATION HISTORY
Medication reconciliation completed by pre-admitting.    Prior to Admission medications    Medication Sig Last Dose Taking? Auth Provider   acetaminophen (TYLENOL) 500 MG tablet Take 500-1,000 mg by mouth every 6 hours as needed for mild pain  Yes Reported, Patient   Capsaicin-Menthol (SALONPAS PAIN REL GEL-PTCH HOT EX) Externally apply topically daily as needed  Yes Reported, Patient   famotidine (PEPCID) 10 MG tablet Take 10 mg by mouth as needed  Yes Reported, Patient   FLUoxetine (PROZAC) 10 MG tablet Take 1 tablet by mouth daily. As directed.   Yes Reported, Patient   losartan (COZAAR) 100 MG tablet Take 100 mg by mouth daily.  Yes Reported, Patient   lovastatin (MEVACOR) 40 MG tablet TAKE 1 TABLET DAILY  Yes Reported, Patient   Menthol-Camphor (ICY HOT ADVANCED PAIN RELIEF EX) Externally apply topically daily as needed  Yes Reported, Patient   metoprolol succinate ER (TOPROL-XL) 50 MG 24 hr tablet Take 50 mg by mouth daily   Yes Reported, Patient   Pumpkin Seed-Soy Germ (AZO BLADDER CONTROL/GO-LESS PO) Take by mouth 2 times daily  Yes Reported, Patient   Turmeric 1053 MG TABS 2 times daily   Yes Reported, Patient   UNABLE TO FIND Nutriferon CAPS:  capsules daily  Yes Reported, Patient

## 2021-10-09 LAB — SARS-COV-2 RNA RESP QL NAA+PROBE: NEGATIVE

## 2021-10-11 ENCOUNTER — HOSPITAL ENCOUNTER (OUTPATIENT)
Facility: CLINIC | Age: 86
LOS: 1 days | Discharge: HOME | End: 2021-10-12
Attending: OBSTETRICS & GYNECOLOGY | Admitting: OBSTETRICS & GYNECOLOGY
Payer: MEDICARE

## 2021-10-11 ENCOUNTER — ANESTHESIA EVENT (OUTPATIENT)
Dept: SURGERY | Facility: CLINIC | Age: 86
End: 2021-10-11
Payer: MEDICARE

## 2021-10-11 ENCOUNTER — ANESTHESIA (OUTPATIENT)
Dept: SURGERY | Facility: CLINIC | Age: 86
End: 2021-10-11
Payer: MEDICARE

## 2021-10-11 DIAGNOSIS — Z98.890 POST-OPERATIVE STATE: Primary | ICD-10-CM

## 2021-10-11 LAB
CREAT SERPL-MCNC: 0.9 MG/DL (ref 0.52–1.04)
GFR SERPL CREATININE-BSD FRML MDRD: 58 ML/MIN/1.73M2
HGB BLD-MCNC: 13.3 G/DL (ref 11.7–15.7)

## 2021-10-11 PROCEDURE — 250N000013 HC RX MED GY IP 250 OP 250 PS 637: Performed by: OBSTETRICS & GYNECOLOGY

## 2021-10-11 PROCEDURE — 250N000009 HC RX 250: Performed by: NURSE ANESTHETIST, CERTIFIED REGISTERED

## 2021-10-11 PROCEDURE — 250N000011 HC RX IP 250 OP 636: Performed by: ANESTHESIOLOGY

## 2021-10-11 PROCEDURE — 250N000011 HC RX IP 250 OP 636: Performed by: OBSTETRICS & GYNECOLOGY

## 2021-10-11 PROCEDURE — 258N000003 HC RX IP 258 OP 636: Performed by: NURSE ANESTHETIST, CERTIFIED REGISTERED

## 2021-10-11 PROCEDURE — 36415 COLL VENOUS BLD VENIPUNCTURE: CPT | Performed by: OBSTETRICS & GYNECOLOGY

## 2021-10-11 PROCEDURE — 88305 TISSUE EXAM BY PATHOLOGIST: CPT | Mod: TC | Performed by: OBSTETRICS & GYNECOLOGY

## 2021-10-11 PROCEDURE — C1771 REP DEV, URINARY, W/SLING: HCPCS | Performed by: OBSTETRICS & GYNECOLOGY

## 2021-10-11 PROCEDURE — 250N000011 HC RX IP 250 OP 636: Performed by: NURSE ANESTHETIST, CERTIFIED REGISTERED

## 2021-10-11 PROCEDURE — 258N000003 HC RX IP 258 OP 636: Performed by: OBSTETRICS & GYNECOLOGY

## 2021-10-11 PROCEDURE — 710N000009 HC RECOVERY PHASE 1, LEVEL 1, PER MIN: Performed by: OBSTETRICS & GYNECOLOGY

## 2021-10-11 PROCEDURE — 82565 ASSAY OF CREATININE: CPT | Performed by: OBSTETRICS & GYNECOLOGY

## 2021-10-11 PROCEDURE — 85018 HEMOGLOBIN: CPT | Performed by: OBSTETRICS & GYNECOLOGY

## 2021-10-11 PROCEDURE — 272N000001 HC OR GENERAL SUPPLY STERILE: Performed by: OBSTETRICS & GYNECOLOGY

## 2021-10-11 PROCEDURE — 271N000001 HC OR GENERAL SUPPLY NON-STERILE: Performed by: OBSTETRICS & GYNECOLOGY

## 2021-10-11 PROCEDURE — 250N000009 HC RX 250: Performed by: OBSTETRICS & GYNECOLOGY

## 2021-10-11 PROCEDURE — 370N000017 HC ANESTHESIA TECHNICAL FEE, PER MIN: Performed by: OBSTETRICS & GYNECOLOGY

## 2021-10-11 PROCEDURE — 360N000080 HC SURGERY LEVEL 7, PER MIN: Performed by: OBSTETRICS & GYNECOLOGY

## 2021-10-11 PROCEDURE — C1781 MESH (IMPLANTABLE): HCPCS | Performed by: OBSTETRICS & GYNECOLOGY

## 2021-10-11 PROCEDURE — 999N000141 HC STATISTIC PRE-PROCEDURE NURSING ASSESSMENT: Performed by: OBSTETRICS & GYNECOLOGY

## 2021-10-11 DEVICE — MESH SLING PROLAPSE POLYFORM SYNTH 10X15CM M0068402580: Type: IMPLANTABLE DEVICE | Site: PELVIS | Status: FUNCTIONAL

## 2021-10-11 DEVICE — MESH SLING ADVANTAGE MID URETHERAL BLUE M0068502120: Type: IMPLANTABLE DEVICE | Site: PELVIS | Status: FUNCTIONAL

## 2021-10-11 RX ORDER — HYDROMORPHONE HYDROCHLORIDE 2 MG/1
4 TABLET ORAL EVERY 4 HOURS PRN
Status: DISCONTINUED | OUTPATIENT
Start: 2021-10-11 | End: 2021-10-12 | Stop reason: HOSPADM

## 2021-10-11 RX ORDER — NALOXONE HYDROCHLORIDE 0.4 MG/ML
0.4 INJECTION, SOLUTION INTRAMUSCULAR; INTRAVENOUS; SUBCUTANEOUS
Status: DISCONTINUED | OUTPATIENT
Start: 2021-10-11 | End: 2021-10-12 | Stop reason: HOSPADM

## 2021-10-11 RX ORDER — ONDANSETRON 4 MG/1
4 TABLET, ORALLY DISINTEGRATING ORAL EVERY 6 HOURS PRN
Status: DISCONTINUED | OUTPATIENT
Start: 2021-10-11 | End: 2021-10-12 | Stop reason: HOSPADM

## 2021-10-11 RX ORDER — OXYCODONE HYDROCHLORIDE 5 MG/1
5 TABLET ORAL EVERY 4 HOURS PRN
Status: DISCONTINUED | OUTPATIENT
Start: 2021-10-11 | End: 2021-10-11 | Stop reason: HOSPADM

## 2021-10-11 RX ORDER — GLYCOPYRROLATE 0.2 MG/ML
INJECTION, SOLUTION INTRAMUSCULAR; INTRAVENOUS PRN
Status: DISCONTINUED | OUTPATIENT
Start: 2021-10-11 | End: 2021-10-11

## 2021-10-11 RX ORDER — HYDROMORPHONE HYDROCHLORIDE 2 MG/1
2 TABLET ORAL EVERY 4 HOURS PRN
Status: DISCONTINUED | OUTPATIENT
Start: 2021-10-11 | End: 2021-10-12 | Stop reason: HOSPADM

## 2021-10-11 RX ORDER — POLYETHYLENE GLYCOL 3350 17 G/17G
17 POWDER, FOR SOLUTION ORAL DAILY
Status: DISCONTINUED | OUTPATIENT
Start: 2021-10-11 | End: 2021-10-12 | Stop reason: HOSPADM

## 2021-10-11 RX ORDER — NALOXONE HYDROCHLORIDE 0.4 MG/ML
0.2 INJECTION, SOLUTION INTRAMUSCULAR; INTRAVENOUS; SUBCUTANEOUS
Status: DISCONTINUED | OUTPATIENT
Start: 2021-10-11 | End: 2021-10-12 | Stop reason: HOSPADM

## 2021-10-11 RX ORDER — FENTANYL CITRATE 50 UG/ML
50 INJECTION, SOLUTION INTRAMUSCULAR; INTRAVENOUS EVERY 5 MIN PRN
Status: DISCONTINUED | OUTPATIENT
Start: 2021-10-11 | End: 2021-10-11 | Stop reason: HOSPADM

## 2021-10-11 RX ORDER — CEFAZOLIN SODIUM 2 G/100ML
2 INJECTION, SOLUTION INTRAVENOUS SEE ADMIN INSTRUCTIONS
Status: DISCONTINUED | OUTPATIENT
Start: 2021-10-11 | End: 2021-10-11 | Stop reason: HOSPADM

## 2021-10-11 RX ORDER — HYDROMORPHONE HCL IN WATER/PF 6 MG/30 ML
0.4 PATIENT CONTROLLED ANALGESIA SYRINGE INTRAVENOUS
Status: DISCONTINUED | OUTPATIENT
Start: 2021-10-11 | End: 2021-10-12 | Stop reason: HOSPADM

## 2021-10-11 RX ORDER — PHENAZOPYRIDINE HYDROCHLORIDE 200 MG/1
200 TABLET, FILM COATED ORAL ONCE
Status: COMPLETED | OUTPATIENT
Start: 2021-10-11 | End: 2021-10-11

## 2021-10-11 RX ORDER — ATORVASTATIN CALCIUM 10 MG/1
10 TABLET, FILM COATED ORAL DAILY
Status: DISCONTINUED | OUTPATIENT
Start: 2021-10-11 | End: 2021-10-12 | Stop reason: HOSPADM

## 2021-10-11 RX ORDER — ONDANSETRON 2 MG/ML
INJECTION INTRAMUSCULAR; INTRAVENOUS PRN
Status: DISCONTINUED | OUTPATIENT
Start: 2021-10-11 | End: 2021-10-11

## 2021-10-11 RX ORDER — ONDANSETRON 4 MG/1
4 TABLET, ORALLY DISINTEGRATING ORAL EVERY 30 MIN PRN
Status: DISCONTINUED | OUTPATIENT
Start: 2021-10-11 | End: 2021-10-11 | Stop reason: HOSPADM

## 2021-10-11 RX ORDER — FENTANYL CITRATE 50 UG/ML
INJECTION, SOLUTION INTRAMUSCULAR; INTRAVENOUS PRN
Status: DISCONTINUED | OUTPATIENT
Start: 2021-10-11 | End: 2021-10-11

## 2021-10-11 RX ORDER — LOSARTAN POTASSIUM 50 MG/1
100 TABLET ORAL DAILY
Status: DISCONTINUED | OUTPATIENT
Start: 2021-10-11 | End: 2021-10-12 | Stop reason: HOSPADM

## 2021-10-11 RX ORDER — NEOSTIGMINE METHYLSULFATE 1 MG/ML
VIAL (ML) INJECTION PRN
Status: DISCONTINUED | OUTPATIENT
Start: 2021-10-11 | End: 2021-10-11

## 2021-10-11 RX ORDER — SODIUM CHLORIDE, SODIUM LACTATE, POTASSIUM CHLORIDE, CALCIUM CHLORIDE 600; 310; 30; 20 MG/100ML; MG/100ML; MG/100ML; MG/100ML
INJECTION, SOLUTION INTRAVENOUS CONTINUOUS PRN
Status: DISCONTINUED | OUTPATIENT
Start: 2021-10-11 | End: 2021-10-11

## 2021-10-11 RX ORDER — METOPROLOL SUCCINATE 50 MG/1
50 TABLET, EXTENDED RELEASE ORAL DAILY
Status: DISCONTINUED | OUTPATIENT
Start: 2021-10-12 | End: 2021-10-12 | Stop reason: HOSPADM

## 2021-10-11 RX ORDER — ONDANSETRON 2 MG/ML
4 INJECTION INTRAMUSCULAR; INTRAVENOUS EVERY 30 MIN PRN
Status: DISCONTINUED | OUTPATIENT
Start: 2021-10-11 | End: 2021-10-11 | Stop reason: HOSPADM

## 2021-10-11 RX ORDER — HALOPERIDOL 5 MG/ML
1 INJECTION INTRAMUSCULAR
Status: DISCONTINUED | OUTPATIENT
Start: 2021-10-11 | End: 2021-10-11 | Stop reason: HOSPADM

## 2021-10-11 RX ORDER — SODIUM CHLORIDE 9 MG/ML
INJECTION, SOLUTION INTRAVENOUS CONTINUOUS
Status: DISCONTINUED | OUTPATIENT
Start: 2021-10-11 | End: 2021-10-12 | Stop reason: HOSPADM

## 2021-10-11 RX ORDER — PROPOFOL 10 MG/ML
INJECTION, EMULSION INTRAVENOUS CONTINUOUS PRN
Status: DISCONTINUED | OUTPATIENT
Start: 2021-10-11 | End: 2021-10-11

## 2021-10-11 RX ORDER — PROPOFOL 10 MG/ML
INJECTION, EMULSION INTRAVENOUS PRN
Status: DISCONTINUED | OUTPATIENT
Start: 2021-10-11 | End: 2021-10-11

## 2021-10-11 RX ORDER — SODIUM CHLORIDE, SODIUM LACTATE, POTASSIUM CHLORIDE, CALCIUM CHLORIDE 600; 310; 30; 20 MG/100ML; MG/100ML; MG/100ML; MG/100ML
INJECTION, SOLUTION INTRAVENOUS CONTINUOUS
Status: DISCONTINUED | OUTPATIENT
Start: 2021-10-11 | End: 2021-10-11 | Stop reason: HOSPADM

## 2021-10-11 RX ORDER — DEXAMETHASONE SODIUM PHOSPHATE 4 MG/ML
INJECTION, SOLUTION INTRA-ARTICULAR; INTRALESIONAL; INTRAMUSCULAR; INTRAVENOUS; SOFT TISSUE PRN
Status: DISCONTINUED | OUTPATIENT
Start: 2021-10-11 | End: 2021-10-11

## 2021-10-11 RX ORDER — PROCHLORPERAZINE MALEATE 5 MG
5 TABLET ORAL EVERY 6 HOURS PRN
Status: DISCONTINUED | OUTPATIENT
Start: 2021-10-11 | End: 2021-10-12 | Stop reason: HOSPADM

## 2021-10-11 RX ORDER — LABETALOL 20 MG/4 ML (5 MG/ML) INTRAVENOUS SYRINGE
PRN
Status: DISCONTINUED | OUTPATIENT
Start: 2021-10-11 | End: 2021-10-11

## 2021-10-11 RX ORDER — PROCHLORPERAZINE 25 MG
12.5 SUPPOSITORY, RECTAL RECTAL EVERY 12 HOURS PRN
Status: DISCONTINUED | OUTPATIENT
Start: 2021-10-11 | End: 2021-10-12 | Stop reason: HOSPADM

## 2021-10-11 RX ORDER — METOPROLOL TARTRATE 1 MG/ML
INJECTION, SOLUTION INTRAVENOUS PRN
Status: DISCONTINUED | OUTPATIENT
Start: 2021-10-11 | End: 2021-10-11

## 2021-10-11 RX ORDER — HYDRALAZINE HYDROCHLORIDE 20 MG/ML
2.5-5 INJECTION INTRAMUSCULAR; INTRAVENOUS EVERY 10 MIN PRN
Status: DISCONTINUED | OUTPATIENT
Start: 2021-10-11 | End: 2021-10-11 | Stop reason: HOSPADM

## 2021-10-11 RX ORDER — LABETALOL HYDROCHLORIDE 5 MG/ML
10 INJECTION, SOLUTION INTRAVENOUS
Status: DISCONTINUED | OUTPATIENT
Start: 2021-10-11 | End: 2021-10-11 | Stop reason: HOSPADM

## 2021-10-11 RX ORDER — FAMOTIDINE 10 MG
10 TABLET ORAL DAILY
Status: DISCONTINUED | OUTPATIENT
Start: 2021-10-11 | End: 2021-10-12 | Stop reason: HOSPADM

## 2021-10-11 RX ORDER — ALBUTEROL SULFATE 0.83 MG/ML
2.5 SOLUTION RESPIRATORY (INHALATION) EVERY 4 HOURS PRN
Status: DISCONTINUED | OUTPATIENT
Start: 2021-10-11 | End: 2021-10-11 | Stop reason: HOSPADM

## 2021-10-11 RX ORDER — ACETAMINOPHEN 325 MG/1
975 TABLET ORAL ONCE
Status: COMPLETED | OUTPATIENT
Start: 2021-10-11 | End: 2021-10-11

## 2021-10-11 RX ORDER — HYDROMORPHONE HCL IN WATER/PF 6 MG/30 ML
0.2 PATIENT CONTROLLED ANALGESIA SYRINGE INTRAVENOUS
Status: DISCONTINUED | OUTPATIENT
Start: 2021-10-11 | End: 2021-10-12 | Stop reason: HOSPADM

## 2021-10-11 RX ORDER — HYDROMORPHONE HCL IN WATER/PF 6 MG/30 ML
0.4 PATIENT CONTROLLED ANALGESIA SYRINGE INTRAVENOUS EVERY 5 MIN PRN
Status: DISCONTINUED | OUTPATIENT
Start: 2021-10-11 | End: 2021-10-11 | Stop reason: HOSPADM

## 2021-10-11 RX ORDER — ONDANSETRON 2 MG/ML
4 INJECTION INTRAMUSCULAR; INTRAVENOUS EVERY 6 HOURS PRN
Status: DISCONTINUED | OUTPATIENT
Start: 2021-10-11 | End: 2021-10-12 | Stop reason: HOSPADM

## 2021-10-11 RX ORDER — KETOROLAC TROMETHAMINE 15 MG/ML
15 INJECTION, SOLUTION INTRAMUSCULAR; INTRAVENOUS EVERY 6 HOURS
Status: COMPLETED | OUTPATIENT
Start: 2021-10-11 | End: 2021-10-12

## 2021-10-11 RX ORDER — LIDOCAINE HYDROCHLORIDE 10 MG/ML
INJECTION, SOLUTION INFILTRATION; PERINEURAL PRN
Status: DISCONTINUED | OUTPATIENT
Start: 2021-10-11 | End: 2021-10-11

## 2021-10-11 RX ORDER — CEFAZOLIN SODIUM 2 G/100ML
2 INJECTION, SOLUTION INTRAVENOUS
Status: COMPLETED | OUTPATIENT
Start: 2021-10-11 | End: 2021-10-11

## 2021-10-11 RX ADMIN — METOPROLOL TARTRATE 1 MG: 5 INJECTION INTRAVENOUS at 15:17

## 2021-10-11 RX ADMIN — GLYCOPYRROLATE 0.2 MG: 0.2 INJECTION, SOLUTION INTRAMUSCULAR; INTRAVENOUS at 13:10

## 2021-10-11 RX ADMIN — LABETALOL 20 MG/4 ML (5 MG/ML) INTRAVENOUS SYRINGE 10 MG: at 14:09

## 2021-10-11 RX ADMIN — PHENAZOPYRIDINE 200 MG: 200 TABLET ORAL at 11:11

## 2021-10-11 RX ADMIN — ROCURONIUM BROMIDE 35 MG: 50 INJECTION, SOLUTION INTRAVENOUS at 13:10

## 2021-10-11 RX ADMIN — HYDRALAZINE HYDROCHLORIDE 5 MG: 20 INJECTION INTRAMUSCULAR; INTRAVENOUS at 18:19

## 2021-10-11 RX ADMIN — FENTANYL CITRATE 100 MCG: 50 INJECTION, SOLUTION INTRAMUSCULAR; INTRAVENOUS at 13:08

## 2021-10-11 RX ADMIN — LIDOCAINE HYDROCHLORIDE 30 MG: 10 INJECTION, SOLUTION INFILTRATION; PERINEURAL at 13:10

## 2021-10-11 RX ADMIN — HYDROMORPHONE HYDROCHLORIDE 1 MG: 1 INJECTION, SOLUTION INTRAMUSCULAR; INTRAVENOUS; SUBCUTANEOUS at 14:07

## 2021-10-11 RX ADMIN — METOPROLOL TARTRATE 2 MG: 5 INJECTION INTRAVENOUS at 15:24

## 2021-10-11 RX ADMIN — ATORVASTATIN CALCIUM 10 MG: 10 TABLET, FILM COATED ORAL at 20:44

## 2021-10-11 RX ADMIN — ROCURONIUM BROMIDE 10 MG: 50 INJECTION, SOLUTION INTRAVENOUS at 13:52

## 2021-10-11 RX ADMIN — PHENYLEPHRINE HYDROCHLORIDE 100 MCG: 10 INJECTION INTRAVENOUS at 13:36

## 2021-10-11 RX ADMIN — LABETALOL 20 MG/4 ML (5 MG/ML) INTRAVENOUS SYRINGE 10 MG: at 14:19

## 2021-10-11 RX ADMIN — PROPOFOL 120 MG: 10 INJECTION, EMULSION INTRAVENOUS at 13:10

## 2021-10-11 RX ADMIN — SODIUM CHLORIDE: 9 INJECTION, SOLUTION INTRAVENOUS at 20:00

## 2021-10-11 RX ADMIN — SODIUM CHLORIDE, POTASSIUM CHLORIDE, SODIUM LACTATE AND CALCIUM CHLORIDE: 600; 310; 30; 20 INJECTION, SOLUTION INTRAVENOUS at 16:24

## 2021-10-11 RX ADMIN — GLYCOPYRROLATE 0.6 MG: 0.2 INJECTION, SOLUTION INTRAMUSCULAR; INTRAVENOUS at 16:11

## 2021-10-11 RX ADMIN — METOPROLOL TARTRATE 1 MG: 5 INJECTION INTRAVENOUS at 15:04

## 2021-10-11 RX ADMIN — PROPOFOL 50 MCG/KG/MIN: 10 INJECTION, EMULSION INTRAVENOUS at 13:17

## 2021-10-11 RX ADMIN — SODIUM CHLORIDE, POTASSIUM CHLORIDE, SODIUM LACTATE AND CALCIUM CHLORIDE: 600; 310; 30; 20 INJECTION, SOLUTION INTRAVENOUS at 13:39

## 2021-10-11 RX ADMIN — CEFAZOLIN SODIUM 2 G: 2 INJECTION, SOLUTION INTRAVENOUS at 13:01

## 2021-10-11 RX ADMIN — FENTANYL CITRATE 50 MCG: 50 INJECTION, SOLUTION INTRAMUSCULAR; INTRAVENOUS at 13:50

## 2021-10-11 RX ADMIN — POLYETHYLENE GLYCOL 3350 17 G: 17 POWDER, FOR SOLUTION ORAL at 20:45

## 2021-10-11 RX ADMIN — KETOROLAC TROMETHAMINE 15 MG: 15 INJECTION, SOLUTION INTRAMUSCULAR; INTRAVENOUS at 20:52

## 2021-10-11 RX ADMIN — ONDANSETRON HYDROCHLORIDE 4 MG: 2 INJECTION, SOLUTION INTRAVENOUS at 15:04

## 2021-10-11 RX ADMIN — HYDRALAZINE HYDROCHLORIDE 5 MG: 20 INJECTION INTRAMUSCULAR; INTRAVENOUS at 18:01

## 2021-10-11 RX ADMIN — PHENYLEPHRINE HYDROCHLORIDE 100 MCG: 10 INJECTION INTRAVENOUS at 15:50

## 2021-10-11 RX ADMIN — ROCURONIUM BROMIDE 15 MG: 50 INJECTION, SOLUTION INTRAVENOUS at 14:40

## 2021-10-11 RX ADMIN — TAZOBACTAM SODIUM AND PIPERACILLIN SODIUM 3.38 G: 375; 3 INJECTION, SOLUTION INTRAVENOUS at 20:48

## 2021-10-11 RX ADMIN — FENTANYL CITRATE 50 MCG: 50 INJECTION, SOLUTION INTRAMUSCULAR; INTRAVENOUS at 13:41

## 2021-10-11 RX ADMIN — FENTANYL CITRATE 50 MCG: 50 INJECTION, SOLUTION INTRAMUSCULAR; INTRAVENOUS at 13:59

## 2021-10-11 RX ADMIN — METOPROLOL TARTRATE 1 MG: 5 INJECTION INTRAVENOUS at 14:58

## 2021-10-11 RX ADMIN — ACETAMINOPHEN 975 MG: 325 TABLET, FILM COATED ORAL at 11:11

## 2021-10-11 RX ADMIN — DEXAMETHASONE SODIUM PHOSPHATE 4 MG: 4 INJECTION, SOLUTION INTRA-ARTICULAR; INTRALESIONAL; INTRAMUSCULAR; INTRAVENOUS; SOFT TISSUE at 13:10

## 2021-10-11 RX ADMIN — NEOSTIGMINE METHYLSULFATE 4 MG: 1 INJECTION, SOLUTION INTRAVENOUS at 16:11

## 2021-10-11 RX ADMIN — SODIUM CHLORIDE, POTASSIUM CHLORIDE, SODIUM LACTATE AND CALCIUM CHLORIDE: 600; 310; 30; 20 INJECTION, SOLUTION INTRAVENOUS at 13:01

## 2021-10-11 RX ADMIN — LOSARTAN POTASSIUM 100 MG: 50 TABLET, FILM COATED ORAL at 20:44

## 2021-10-11 RX ADMIN — FAMOTIDINE 10 MG: 10 TABLET ORAL at 20:44

## 2021-10-11 ASSESSMENT — MIFFLIN-ST. JEOR
SCORE: 1126.76
SCORE: 1106.07

## 2021-10-11 NOTE — OP NOTE
OPERATIVE REPORT     NAME: Buffy Oconnell   MR#: [id number]  : 1934    DATE OF OPERATION: 2021    SURGEON: Alize White MD    PREOPERATIVE DIAGNOSES:  1. Post-hysterectomy vaginal prolapse.  2. Associated cystocele, rectocele and enterocele  3. Stress urinary incontinence/ intrinsic urethral sphincter deficiency    POSTOPERATIVE DIAGNOSES:  1. Post-hysterectomy vaginal prolapse.  2. Associated cystocele, rectocele and enterocele  3. Stress urinary incontinence/ intrinsic urethral sphincter deficiency    PROCEDURE:  1. Da Abad laparoscopic sacral colpopexy.  2. Da Abad laparoscopic abdominal halban enterocele repair  3. Bilateral salpingo-oophorectomy  4. Retropubic midurethral sling  5. Cystourethroscopy.    ASSISTANT:  HUNG Cid    ANESTHESIA:  General endotracheal.    ESTIMATED BLOOD LOSS:  50 ml    IV FLUIDS:  1900 ml crystalloid    FINDINGS:    The bladder was found to be free of lesion. Ureters were in their normal anatomic positions, were noted to be patent via administration of dye.    The adnexa were normal appearing    Normal anorectal examination at the conclusion of the procedure.    COMPLICATIONS:    None    DRAINS:    Kimble catheter to gravity drainage.    PACKING:    Saline-soaked vaginal packing placed.    INDICATIONS:  This patient was seen in consultation regarding post-hysterectomy prolapse. Please refer to her clinic documentation for a complete description of her evaluation and treatment plan. She was desirous of a definitive surgical approach. Prior to the procedure the risks, benefits, indications, and alternatives were discussed. Written and verbal consent were obtained.    PROCEDURE IN DETAIL:  The patient was brought to the operating suite. She was administered prophylactic IV antibiotics, she had sequential compression devices present and functioning on her lower extremities. She was placed in the supine position, administered general endotracheal  anesthesia without complication. She was now carefully positioned in the dorsal lithotomy position with her legs carefully stationed in Yellowfin stirrups, with attention to avoiding pressure points. An exam under anesthesia was performed with noted vaginal relaxation, no adnexal or parametrial masses, normal anorectal exam. She was now sterilely prepped and draped both abdominally and vaginally, and an 18-Icelandic Kimble catheter was placed to gravity drainage.    Laparoscopic entry:  At the base of the umbilicus, an 8 mm incision was created. The Veress needle was then inserted and the intra-abdominal cavity was insufflated with CO2 gas to a pressure of 15 mmHg. The 8 mm trocar was then inserted and inspection of the intra-abdominal cavity showed there to be no lesions. She was placed in steep Trendelenburg position and 3 additional laparoscopic ports were placed; a 8 mm right lower quadrant, 8 mm mid left quadrant, and 8 mm far left quadrant. The da Abad robotic arms were brought to the patient's bedside and operative control was assumed at the console.    Bilateral Salpingo-oophorectomy:  The right infundibulo-pelvic ligament was elevated. A peritoneal window was created below the ligament and above the level of the visualized ureter. The right IP ligament was then cauterized. The adnexa was grasped, the peritoneum was cauterized mobilizing and the specimen. This was repeated on the left side in-a similar fashion. The specimens were bagged and resected from the umbilicus.    Sacral colpopexy:  A Lucite probe was placed within the vaginal canal. Anteriorly, the bladder was dissected down the anterior vaginal muscularis approximately 8 cm. Posteriorly, the peritoneum was dissected off the posterior rectovaginal septum and dissected down to, as close to the perineal body as possible (10 cm).    Sacral dissection: At the sacral promontory the right ureter was noted to be lateral to the area of dissection. The  peritoneum was elevated and incised. The peritoneal incision was taken down around the pelvic curvature to meet up with the posterior vaginal dissection. The peritoneal edges were carefully mobilized for future closure. At the promontory the connective tissue was dissected down to the anterior longitudinal ligament. The middle sacral vessel was cauterized.    Mesh Attachment. A 5 cm x 15 cm piece of polypropylene mesh (Polyform, [employer]) was attached to the anterior vaginal muscularis using approximately 6 interrupted sutures of 2-0 PDS. An identical sheet of mesh was attached to the posterior vaginal wall using approximately 8 interrupted sutures of 2-0 PDS. The long arms of the mesh were now brought to the sacral promontory and attached to the anterior longitudinal ligament using 3 interrupted sutures of 0- Goretex. Appropriate tensioning was ascertained using visual and palpating clues. Redundant longitudinal mesh was trimmed and the resultant peritoneum was closed, thus retroperitonealizing the mesh repair.    Enterocele Repair, abdominal approach:  The redundant peritoneal tissue was plicated using 0-monocryl suture, this is performed to obliterated/resect the pelvic enterocele space.    Cystourethroscopy was now performed, which noted patent ureters bilaterally and normal appearing bladder.    Laparoscopic closure:  The fascia of the umbilical port was closed with 0-vicryl suture. The CO2 gas was allowed to escape from the patient's abdomen, and the resultant 4 skin incisions were closed with a subcuticular suture of 4-0 Vicryl, and skin glue was applied.     Retropubic midurethral sling:  Two marks were created on the anterior abdominal wall 2.5 cm lateral to midline at the level of the pubic bone. Attention was turned to the vagina, where an Allis clamp was applied 1 cm distal from the meatus, an additional Allis clamp 2.5 cm from the meatus. Periurethral tissue was injected with a solution of  Marcaine with epinephrine. A 1.5 cm incision was created between the 2 Allis clamps beneath the mid urethra in the sagittal plane. Two periurethral tunnels were then created out laterally towards the pubic bone. Once an adequate dissection had been performed, the BitMethod Advantage Fit device was selected and loaded. The trocar was brought through the patient's left periurethral tunnel, then back behind the pubic bone, through the space of Retzius, and out through the previously created freida on the anterior abdominal wall. The blue stay sheath was left in place.  This was repeated in a similar fashion on the patient's right side. The urethra was diverted in ipsilateral fashion during trocar placement.   Cystourethroscopy was now performed with the above noted normal findings. The cystoscope was removed. The sling material was appropriately positioned beneath the mid urethra with no overt tension. The resultant incision was closed with a running locking suture of 2-0 Vicryl. Excess sling material on the anterior abdominal wall was trimmed. The resultant incisions were closed with Dermabond.    Normal anorectal examination was verified. The vagina was packed with a saline-soaked vaginal packing. She had a 16-Tongan Kimble catheter present to gravity drainage. Sponge, lap, and needle counts were found to be correct. There were no complications from surgery. Patient was awoken from anesthesia and brought to the recovery room in excellent condition.    Alize White MD

## 2021-10-11 NOTE — ANESTHESIA POSTPROCEDURE EVALUATION
Patient: Buffy Oconnell    Procedure: Procedure(s):  Xi robotic laparoscopic sacrocolpopexy, bilateral salpingo-oophorectomy, abdominal enterocele repair, cystoscopy, midurethral sling       Diagnosis:Prolapse of vaginal vault after hysterectomy [N99.3]  Female stress incontinence [N39.3]  Enterocele [K46.9]  Incomplete defecation [R15.0]  Rectocele [N81.6]  Diagnosis Additional Information: No value filed.    Anesthesia Type:  General    Note:  Disposition: Admission   Postop Pain Control: Uneventful            Sign Out: Well controlled pain   PONV: No   Neuro/Psych: Uneventful            Sign Out: Acceptable/Baseline neuro status   Airway/Respiratory: Uneventful            Sign Out: Acceptable/Baseline resp. status   CV/Hemodynamics: Uneventful            Sign Out: Acceptable CV status   Other NRE: NONE   DID A NON-ROUTINE EVENT OCCUR? No           Last vitals:  Vitals Value Taken Time   /79 10/11/21 1715   Temp 97.7  F (36.5  C) 10/11/21 1627   Pulse 54 10/11/21 1720   Resp 9 10/11/21 1720   SpO2 98 % 10/11/21 1720   Vitals shown include unvalidated device data.    Electronically Signed By: Rajiv Miller MD  October 11, 2021  5:21 PM

## 2021-10-11 NOTE — DISCHARGE INSTRUCTIONS
Prolapse/Pelvic Reconstructive Surgery  Instructions for Caring for yourself after Surgery     How do I manage my pain?   Pain and tenderness should lessen each day. To help keep pain under control, use the following guidelines:    Apply ice packs to your perineum (vaginal and rectal area) for the 1st couple of days.    Take 600 milligrams (mg) of ibuprofen (Advil) every 6 hours for the 1st several days.     Use your prescribed narcotic (hydromorphone) for additional pain relief as needed.    Do not drive, drink alcohol or make any major decisions, such as signing important papers or managing legal issues, while taking prescription pain medication.     Take pain medication with food to avoid an upset stomach.    How do I care for my perineum?    Use pads for vaginal discharge after surgery. Discharge is normal and can last several weeks. Discharge may appear bloody, yellow or white.    Do not place anything in your vagina until advised by your doctor.     What about bathing?       Do not take a tub bath, use a hot tub or swim until advised by your doctor. You may take showers.    What about bowel and bladder management?    Keep stools soft and regular. We recommend using the following medicine that loosens stools and increases bowel movements:  - MiraLAX-  17 grams or a capful daily following surgery.      When urinating, do not bear down. Relax and allow the bladder muscle to contract. If you are unable to urinate, contact your doctor.    If you go home with a catheter, your doctor may prescribe an antibiotic for you to take before bed to help prevent infection. Follow up in the clinic as instructed to have the catheter removed.    What about activity?    Do not lift more than 10 pounds for 12 weeks after surgery. Avoid heavy pushing or pulling, such as vacuuming or lawn mowing. Your body s tissues need time to heal and regain maximum strength.    Keep Active. Walking is encouraged. Gradually build up how long and  far you walk. Climbing stairs is OK if able.        You may resume driving when you are no longer taking narcotic pain medication and have the strength to use the brake pedal as needed.     When do I call my doctor?  Call Dr. White call [phone] if you have:     (for urgent questions/concerns CELL PHONE [phone])    -Any post-operative questions or concerns     -A fever over 100.4 F (38 C)      -Difficulty emptying your bladder    -Chills         -Worsening pain                -Nausea or vomiting

## 2021-10-11 NOTE — ANESTHESIA PREPROCEDURE EVALUATION
Anesthesia Pre-Procedure Evaluation    Patient: Buffy Oconnell   MRN: [id number] : 1934        Preoperative Diagnosis: Prolapse of vaginal vault after hysterectomy [N99.3]  Female stress incontinence [N39.3]  Enterocele [K46.9]  Incomplete defecation [R15.0]  Rectocele [N81.6]    Procedure : Procedure(s):  Xi robotic laparoscopic sacral colpopexy, bilateral salpingectomy, abdominal enterocele repairr, cystoscopy and possible midurethral sling and posterior repair          Past Medical History:   Diagnosis Date     Arthritis     knees, thumbs     Hyperlipemia      Hypertension       Past Surgical History:   Procedure Laterality Date     BREAST SURGERY Left 2007    lumpectomy     ENT SURGERY      T&A at age 19     GYN SURGERY  1964    vagial hysterectomy     ORTHOPEDIC SURGERY      bunionectomy      Allergies   Allergen Reactions     Triamterene-Hctz Hives     Darvocet [Propoxyphene N-Apap]      Darvocet-N 100 tabs     Hydrochlorothiazide W/Triamterene      PN: LW Reaction: HIVES     Macrodantin [Nitrofurantoin]      caps     Sulfamethoxazole-Trimethoprim      PN: LW Reaction: HIVES      Social History     Tobacco Use     Smoking status: Never Smoker     Smokeless tobacco: Never Used   Substance Use Topics     Alcohol use: Yes     Comment: occasional      Wt Readings from Last 1 Encounters:   10/11/21 72.6 kg (160 lb)        Anesthesia Evaluation            ROS/MED HX  ENT/Pulmonary:  - neg pulmonary ROS     Neurologic:       Cardiovascular:     (+) hypertension-----    METS/Exercise Tolerance:     Hematologic:       Musculoskeletal:       GI/Hepatic:       Renal/Genitourinary:       Endo:       Psychiatric/Substance Use:       Infectious Disease:       Malignancy:   (+) Malignancy, History of Breast.    Other:            Physical Exam    Airway        Mallampati: II   TM distance: > 3 FB   Neck ROM: full     Respiratory Devices and Support         Dental           Cardiovascular   cardiovascular exam normal           Pulmonary   pulmonary exam normal                OUTSIDE LABS:  CBC:   Lab Results   Component Value Date    WBC 6.9 02/16/2015    WBC 6.9 08/21/2014    HGB 13.3 10/11/2021    HGB 13.2 02/16/2015    HCT 40.6 02/16/2015    HCT 37.2 08/21/2014     02/16/2015     08/21/2014     BMP:   Lab Results   Component Value Date     02/16/2015     08/21/2014    POTASSIUM 4.4 02/16/2015    POTASSIUM 4.4 08/21/2014    CHLORIDE 105 02/16/2015    CHLORIDE 107 08/21/2014    CO2 29 02/16/2015    CO2 27 08/21/2014    BUN 27 02/16/2015    BUN 18 08/21/2014    CR 0.81 02/16/2015    CR 0.78 08/21/2014    GLC 93 02/16/2015     (H) 08/21/2014     COAGS:   Lab Results   Component Value Date    PTT 26 02/18/2008    INR 0.95 02/18/2008     POC:   Lab Results   Component Value Date    BGM 97 04/14/2010     HEPATIC:   Lab Results   Component Value Date    ALBUMIN 3.7 02/16/2015    PROTTOTAL 7.2 02/16/2015    ALT 31 02/16/2015    AST 20 02/16/2015    ALKPHOS 113 02/16/2015    BILITOTAL 0.2 02/16/2015     OTHER:   Lab Results   Component Value Date    YULY 10.0 02/16/2015    TSH 2.32 04/08/2010       Anesthesia Plan    ASA Status:  2      Anesthesia Type: General.     - Airway: ETT   Induction: Intravenous.   Maintenance: Balanced.        Consents    Anesthesia Plan(s) and associated risks, benefits, and realistic alternatives discussed. Questions answered and patient/representative(s) expressed understanding.     - Discussed with:  Patient         Postoperative Care    Pain management: Multi-modal analgesia.   PONV prophylaxis: Ondansetron (or other 5HT-3), Dexamethasone or Solumedrol     Comments:                Kiki Currie MD

## 2021-10-11 NOTE — ANESTHESIA CARE TRANSFER NOTE
Patient: Buffy Oconnell    Procedure: Procedure(s):  Xi robotic laparoscopic sacrocolpopexy, bilateral salpingo-oophorectomy, abdominal enterocele repair, cystoscopy, midurethral sling       Diagnosis: Prolapse of vaginal vault after hysterectomy [N99.3]  Female stress incontinence [N39.3]  Enterocele [K46.9]  Incomplete defecation [R15.0]  Rectocele [N81.6]  Diagnosis Additional Information: No value filed.    Anesthesia Type:   General     Note:    Oropharynx: oral airway in place  Level of Consciousness: drowsy  Oxygen Supplementation: face mask  Level of Supplemental Oxygen (L/min / FiO2): 6  Independent Airway: airway patency satisfactory and stable  Dentition: dentition unchanged  Vital Signs Stable: post-procedure vital signs reviewed and stable  Report to RN Given: handoff report given  Patient transferred to: PACU    Handoff Report: Identifed the Patient, Identified the Reponsible Provider, Reviewed the pertinent medical history, Discussed the surgical course, Reviewed Intra-OP anesthesia mangement and issues during anesthesia, Set expectations for post-procedure period and Allowed opportunity for questions and acknowledgement of understanding      Vitals:  Vitals Value Taken Time   /78 10/11/21 1627   Temp     Pulse 76 10/11/21 1629   Resp 11 10/11/21 1629   SpO2 94 % 10/11/21 1629   Vitals shown include unvalidated device data.    Electronically Signed By: MELY Whitlock CRNA  October 11, 2021  4:30 PM

## 2021-10-12 ENCOUNTER — DOCUMENTATION ONLY (OUTPATIENT)
Dept: OTHER | Facility: CLINIC | Age: 86
End: 2021-10-12

## 2021-10-12 VITALS
HEART RATE: 58 BPM | DIASTOLIC BLOOD PRESSURE: 50 MMHG | HEIGHT: 62 IN | TEMPERATURE: 98 F | BODY MASS INDEX: 30.28 KG/M2 | OXYGEN SATURATION: 98 % | SYSTOLIC BLOOD PRESSURE: 125 MMHG | WEIGHT: 164.56 LBS | RESPIRATION RATE: 18 BRPM

## 2021-10-12 LAB
ANION GAP SERPL CALCULATED.3IONS-SCNC: 4 MMOL/L (ref 3–14)
BUN SERPL-MCNC: 19 MG/DL (ref 7–30)
CALCIUM SERPL-MCNC: 8.7 MG/DL (ref 8.5–10.1)
CHLORIDE BLD-SCNC: 107 MMOL/L (ref 94–109)
CO2 SERPL-SCNC: 28 MMOL/L (ref 20–32)
CREAT SERPL-MCNC: 0.96 MG/DL (ref 0.52–1.04)
ERYTHROCYTE [DISTWIDTH] IN BLOOD BY AUTOMATED COUNT: 14 % (ref 10–15)
GFR SERPL CREATININE-BSD FRML MDRD: 53 ML/MIN/1.73M2
GLUCOSE BLD-MCNC: 115 MG/DL (ref 70–99)
HCT VFR BLD AUTO: 36.4 % (ref 35–47)
HGB BLD-MCNC: 11.2 G/DL (ref 11.7–15.7)
MCH RBC QN AUTO: 29.3 PG (ref 26.5–33)
MCHC RBC AUTO-ENTMCNC: 30.8 G/DL (ref 31.5–36.5)
MCV RBC AUTO: 95 FL (ref 78–100)
PLATELET # BLD AUTO: 175 10E3/UL (ref 150–450)
POTASSIUM BLD-SCNC: 5.2 MMOL/L (ref 3.4–5.3)
RBC # BLD AUTO: 3.82 10E6/UL (ref 3.8–5.2)
SODIUM SERPL-SCNC: 139 MMOL/L (ref 133–144)
WBC # BLD AUTO: 10.8 10E3/UL (ref 4–11)

## 2021-10-12 PROCEDURE — 250N000013 HC RX MED GY IP 250 OP 250 PS 637: Performed by: OBSTETRICS & GYNECOLOGY

## 2021-10-12 PROCEDURE — 250N000011 HC RX IP 250 OP 636: Performed by: OBSTETRICS & GYNECOLOGY

## 2021-10-12 PROCEDURE — 85014 HEMATOCRIT: CPT | Performed by: OBSTETRICS & GYNECOLOGY

## 2021-10-12 PROCEDURE — 999N000147 HC STATISTIC PT IP EVAL DEFER: Performed by: PHYSICAL THERAPIST

## 2021-10-12 PROCEDURE — 36415 COLL VENOUS BLD VENIPUNCTURE: CPT | Performed by: OBSTETRICS & GYNECOLOGY

## 2021-10-12 PROCEDURE — 80048 BASIC METABOLIC PNL TOTAL CA: CPT | Performed by: OBSTETRICS & GYNECOLOGY

## 2021-10-12 PROCEDURE — 258N000003 HC RX IP 258 OP 636: Performed by: OBSTETRICS & GYNECOLOGY

## 2021-10-12 RX ORDER — HYDROMORPHONE HYDROCHLORIDE 2 MG/1
2 TABLET ORAL EVERY 6 HOURS PRN
Qty: 10 TABLET | Refills: 0 | Status: SHIPPED | OUTPATIENT
Start: 2021-10-12 | End: 2021-10-15

## 2021-10-12 RX ORDER — CIPROFLOXACIN 250 MG/1
250 TABLET, FILM COATED ORAL 2 TIMES DAILY
Qty: 14 TABLET | Refills: 0 | Status: SHIPPED | OUTPATIENT
Start: 2021-10-12 | End: 2021-10-19

## 2021-10-12 RX ORDER — IBUPROFEN 600 MG/1
600 TABLET, FILM COATED ORAL EVERY 6 HOURS PRN
Qty: 30 TABLET | Refills: 0 | Status: SHIPPED | OUTPATIENT
Start: 2021-10-12

## 2021-10-12 RX ORDER — POLYETHYLENE GLYCOL 3350 17 G/17G
17 POWDER, FOR SOLUTION ORAL DAILY
Qty: 510 G | Refills: 0 | Status: SHIPPED | OUTPATIENT
Start: 2021-10-12

## 2021-10-12 RX ADMIN — SODIUM CHLORIDE: 9 INJECTION, SOLUTION INTRAVENOUS at 07:00

## 2021-10-12 RX ADMIN — KETOROLAC TROMETHAMINE 15 MG: 15 INJECTION, SOLUTION INTRAMUSCULAR; INTRAVENOUS at 10:00

## 2021-10-12 RX ADMIN — KETOROLAC TROMETHAMINE 15 MG: 15 INJECTION, SOLUTION INTRAMUSCULAR; INTRAVENOUS at 16:41

## 2021-10-12 RX ADMIN — TAZOBACTAM SODIUM AND PIPERACILLIN SODIUM 3.38 G: 375; 3 INJECTION, SOLUTION INTRAVENOUS at 10:00

## 2021-10-12 RX ADMIN — POLYETHYLENE GLYCOL 3350 17 G: 17 POWDER, FOR SOLUTION ORAL at 10:00

## 2021-10-12 RX ADMIN — TAZOBACTAM SODIUM AND PIPERACILLIN SODIUM 3.38 G: 375; 3 INJECTION, SOLUTION INTRAVENOUS at 16:41

## 2021-10-12 RX ADMIN — TAZOBACTAM SODIUM AND PIPERACILLIN SODIUM 3.38 G: 375; 3 INJECTION, SOLUTION INTRAVENOUS at 01:41

## 2021-10-12 RX ADMIN — KETOROLAC TROMETHAMINE 15 MG: 15 INJECTION, SOLUTION INTRAMUSCULAR; INTRAVENOUS at 02:49

## 2021-10-12 NOTE — PLAN OF CARE
Pertinent assessments: A&Ox4. VSS, hypertensive on arrival to floor, stabilized. C/o pain, scheduled toradol given. Denies n/v. 1L on capno. Kimble patent with 300 orange tinged output. 4 abd lap sites open to air, and 2 pubic lap sites open to air. Vaginal packing in place. Tolerating clear liquids.     Major Shift Events: admitted to unit     Treatment Plan: zosyn, pain management

## 2021-10-12 NOTE — PLAN OF CARE
PT: Orders received. Chart reviewed and discussed with care team and patient.  Pt has been up independently in room without any mobility concerns since procedure. Pt will have family support for assistance if needed the next few days. Has appropriate mobility devices if needed d/t discomfort. Declining need for formal PT evaluation.  Defer discharge recommendations to care team.  Will complete orders.

## 2021-10-12 NOTE — PROGRESS NOTES
"SPIRITUAL HEALTH SERVICES Progress Note  [facility] Surg[facility] 5    Saw pt Buffy Oconnell per an admission request.  Offered reflective conversation to facilitate the processing of thoughts and feelings.      Illness Narrative - Buffy reported that she had \"pelvic surgery\" yesterday.      Distress -   sandor Baer was reading Why Bad Things Happen to Good People and shared that her spouse  about a year ago.  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  Invited her to consider if there is anything in reality that cannot be empirical observed.   sandor Baer expressed concern for her younger daughter who is going through a difficult time.      Coping -   snador Baer named her two daughters as being core to her support network; both of whom live locally.  She attended her grandson's wedding a month ago.  sandor Baer identifies as Oriental orthodox and welcomed prayer during our conversation.      Meaning-Making -   sandor Baer shared that one of her core values is contributing what we can and helping others around us.  She shared that after she retired she took care of five of her relatives at the end of their lives, the last being her spouse.  Buffy reflected that now it's time to take care of herself; however, she continues to contribute to the community through giving to food shelves and helping a neighbor.    sandor Baer reflected that she has approached a stage in her life whereby she does not ask why the world is the way it is but accepts that \"the world is imperfect and I can do what I can to make my corner of the world a better place.\"      Plan - Pt anticipates discharging home tomorrow.      Fuad Pollard M.Div., [facility]  Staff   Phone [phone]  "

## 2021-10-12 NOTE — PLAN OF CARE
Orientation:A&ox3  Vss afebrile.   02:weaned off 1/2 LNC. 97% ra  LS:clear  GI:bs hypo, - gas. Tolerating diet  : dougherty present with adequate urine output.  Skin:lap sites intact  Activity: up in room, sat in recliner with sba of 1  Pain: pain controlled with scheduled tylenol  Plan: MD to check in on pt this afternoon. Possible discharge to this afternoon or tomorrow am.  Dougherty removed. straight cath inserted to fill bladder to 300cc per MD recommendation. Pt attempting to void afterwards. Packing removed. Packing intact.

## 2021-10-12 NOTE — PROGRESS NOTES
"UROGYNECOLOGY    POST-OP DAY #1    S: Doing well, appears well, oriented  Ambulating: not up yet  Diet: ice  Flatus: no   Pain control: good on toradol  Vaginal Pack: in place  Kimble catheter: in place    O:  Vitals: BP (!) 148/55 (BP Location: Right arm)   Pulse 63   Temp 98  F (36.7  C) (Oral)   Resp 18   Ht 1.562 m (5' 1.5\")   Wt 74.6 kg (164 lb 9 oz)   SpO2 96%   BMI 30.59 kg/m    BMI= Body mass index is 30.59 kg/m .      Intake/Output Summary (Last 24 hours) at 10/12/2021 0620  Last data filed at 10/11/2021 2242  Gross per 24 hour   Intake 2600 ml   Output 950 ml   Net 1650 ml       Appears healthy and well, A&O x3  Abdomen is soft, slight bloating, incisions C/D/I, good BS  Ext SCD, no edema  no perineal edema, incisions intact.    HGB:  pre-op 13.3   Post-op pending  Creat 10/11/21: 0.90, GFR 58    POD#1  A) Post-Operative Care:    ambulate - order PT/OT to assist with history of knee osteoarthritis    ADAT    continue with pain control strategies.    perform voiding trial when able to ambulate without assistance. (not ordered yet)    I reviewed post-operative instructions and precautions/ written information provided.    Discharge home this evening or tomorrow depending on her post-operative recovery progress.    Follow-up  Based on outcome of voiding trial    B) Medical   HTN: restart oral medications    C) I will assess her progress around noon today, refine our disposition plan then.    Alize White MD    "

## 2021-10-12 NOTE — PLAN OF CARE
For vital signs and complete assessments, please see documentation flowsheets.     Pertinent assessments: A&O. Abdominal and pubic incisions CDI. Vaginal packing in place and patient wearing a heidi-pad - scant amount of dried drainage on pad. On 0.5 L oxygen with capno monitoring. Kimble patent. PCDs on. Pain managed with scheduled toradol. IVF infusing. Tolerated sips of water overnight. Denies nausea.   Treatment Plan: Post-op monitoring, pain management, zosyn.  Bedside Nurse: Jessica Burciaga

## 2021-10-13 LAB
PATH REPORT.COMMENTS IMP SPEC: NORMAL
PATH REPORT.COMMENTS IMP SPEC: NORMAL
PATH REPORT.FINAL DX SPEC: NORMAL
PATH REPORT.GROSS SPEC: NORMAL
PATH REPORT.MICROSCOPIC SPEC OTHER STN: NORMAL
PATH REPORT.RELEVANT HX SPEC: NORMAL
PHOTO IMAGE: NORMAL

## 2021-10-13 PROCEDURE — 88307 TISSUE EXAM BY PATHOLOGIST: CPT | Mod: 26 | Performed by: PATHOLOGY

## 2021-10-13 NOTE — PLAN OF CARE
Patient adequate for discharge per MD. MD verbalized discharge orders via telephone with read back. Discharge instructions and new medication education provided to patient and daughter who was present, patient verbalized understanding. Discharging home, ride via daughter. All belongings returned to patient.

## 2021-11-14 ENCOUNTER — HEALTH MAINTENANCE LETTER (OUTPATIENT)
Age: 86
End: 2021-11-14

## 2022-08-16 ENCOUNTER — HOSPITAL ENCOUNTER (OUTPATIENT)
Dept: MAMMOGRAPHY | Facility: CLINIC | Age: 87
Discharge: HOME | End: 2022-08-16
Attending: FAMILY MEDICINE | Admitting: FAMILY MEDICINE
Payer: MEDICARE

## 2022-08-16 DIAGNOSIS — Z12.31 VISIT FOR SCREENING MAMMOGRAM: ICD-10-CM

## 2022-08-16 PROCEDURE — 77067 SCR MAMMO BI INCL CAD: CPT

## 2022-11-21 ENCOUNTER — HEALTH MAINTENANCE LETTER (OUTPATIENT)
Age: 87
End: 2022-11-21

## 2023-11-26 ENCOUNTER — HEALTH MAINTENANCE LETTER (OUTPATIENT)
Age: 88
End: 2023-11-26

## 2024-02-04 ENCOUNTER — HEALTH MAINTENANCE LETTER (OUTPATIENT)
Age: 89
End: 2024-02-04

## 2025-01-04 ENCOUNTER — HEALTH MAINTENANCE LETTER (OUTPATIENT)
Age: OVER 89
End: 2025-01-04

## 2025-03-02 ENCOUNTER — HEALTH MAINTENANCE LETTER (OUTPATIENT)
Age: OVER 89
End: 2025-03-02

## 2025-08-10 ENCOUNTER — HOSPITAL ENCOUNTER (OUTPATIENT)
Facility: CLINIC | Age: OVER 89
Setting detail: OBSERVATION
Discharge: NURSING, REHAB OR LONG-TERM CARE | End: 2025-08-14
Attending: EMERGENCY MEDICINE
Payer: MEDICARE

## 2025-08-10 DIAGNOSIS — M25.551 BILATERAL HIP PAIN: ICD-10-CM

## 2025-08-10 DIAGNOSIS — N30.00 ACUTE CYSTITIS WITHOUT HEMATURIA: Primary | ICD-10-CM

## 2025-08-10 DIAGNOSIS — R53.1 GENERALIZED WEAKNESS: ICD-10-CM

## 2025-08-10 DIAGNOSIS — R55 PRE-SYNCOPE: ICD-10-CM

## 2025-08-10 DIAGNOSIS — M25.552 BILATERAL HIP PAIN: ICD-10-CM

## 2025-08-10 PROCEDURE — 99285 EMERGENCY DEPT VISIT HI MDM: CPT | Performed by: EMERGENCY MEDICINE

## 2025-08-11 LAB
ALBUMIN UR-MCNC: 20 MG/DL
ANION GAP SERPL CALCULATED.3IONS-SCNC: 12 MMOL/L (ref 7–15)
ANION GAP SERPL CALCULATED.3IONS-SCNC: 14 MMOL/L (ref 7–15)
APPEARANCE UR: ABNORMAL
ATRIAL RATE - MUSE: 68 BPM
BACTERIA #/AREA URNS HPF: ABNORMAL /HPF
BASOPHILS # BLD AUTO: 0 10E3/UL (ref 0–0.2)
BASOPHILS NFR BLD AUTO: 0 %
BILIRUB UR QL STRIP: NEGATIVE
BUN SERPL-MCNC: 14.6 MG/DL (ref 8–23)
BUN SERPL-MCNC: 17.6 MG/DL (ref 8–23)
CALCIUM SERPL-MCNC: 10.2 MG/DL (ref 8.8–10.4)
CALCIUM SERPL-MCNC: 9.8 MG/DL (ref 8.8–10.4)
CHLORIDE SERPL-SCNC: 103 MMOL/L (ref 98–107)
CHLORIDE SERPL-SCNC: 106 MMOL/L (ref 98–107)
COLOR UR AUTO: YELLOW
CREAT SERPL-MCNC: 0.7 MG/DL (ref 0.51–0.95)
CREAT SERPL-MCNC: 0.8 MG/DL (ref 0.51–0.95)
DIASTOLIC BLOOD PRESSURE - MUSE: NORMAL MMHG
EGFRCR SERPLBLD CKD-EPI 2021: 70 ML/MIN/1.73M2
EGFRCR SERPLBLD CKD-EPI 2021: 82 ML/MIN/1.73M2
EOSINOPHIL # BLD AUTO: 0 10E3/UL (ref 0–0.7)
EOSINOPHIL NFR BLD AUTO: 0 %
ERYTHROCYTE [DISTWIDTH] IN BLOOD BY AUTOMATED COUNT: 15.5 % (ref 10–15)
ERYTHROCYTE [DISTWIDTH] IN BLOOD BY AUTOMATED COUNT: 15.5 % (ref 10–15)
FLUAV RNA SPEC QL NAA+PROBE: NEGATIVE
FLUBV RNA RESP QL NAA+PROBE: NEGATIVE
GLUCOSE SERPL-MCNC: 85 MG/DL (ref 70–99)
GLUCOSE SERPL-MCNC: 93 MG/DL (ref 70–99)
GLUCOSE UR STRIP-MCNC: NEGATIVE MG/DL
HCO3 SERPL-SCNC: 23 MMOL/L (ref 22–29)
HCO3 SERPL-SCNC: 24 MMOL/L (ref 22–29)
HCT VFR BLD AUTO: 33.3 % (ref 35–47)
HCT VFR BLD AUTO: 34 % (ref 35–47)
HGB BLD-MCNC: 10.9 G/DL (ref 11.7–15.7)
HGB BLD-MCNC: 11.2 G/DL (ref 11.7–15.7)
HGB UR QL STRIP: ABNORMAL
HOLD SPECIMEN: NORMAL
IMM GRANULOCYTES # BLD: 0.1 10E3/UL
IMM GRANULOCYTES NFR BLD: 1 %
INTERPRETATION ECG - MUSE: NORMAL
KETONES UR STRIP-MCNC: 10 MG/DL
LEUKOCYTE ESTERASE UR QL STRIP: ABNORMAL
LYMPHOCYTES # BLD AUTO: 1 10E3/UL (ref 0.8–5.3)
LYMPHOCYTES NFR BLD AUTO: 8 %
MCH RBC QN AUTO: 28.9 PG (ref 26.5–33)
MCH RBC QN AUTO: 29 PG (ref 26.5–33)
MCHC RBC AUTO-ENTMCNC: 32.7 G/DL (ref 31.5–36.5)
MCHC RBC AUTO-ENTMCNC: 32.9 G/DL (ref 31.5–36.5)
MCV RBC AUTO: 88 FL (ref 78–100)
MCV RBC AUTO: 88 FL (ref 78–100)
MONOCYTES # BLD AUTO: 0.9 10E3/UL (ref 0–1.3)
MONOCYTES NFR BLD AUTO: 7 %
NEUTROPHILS # BLD AUTO: 9.8 10E3/UL (ref 1.6–8.3)
NEUTROPHILS NFR BLD AUTO: 83 %
NITRATE UR QL: POSITIVE
NRBC # BLD AUTO: 0 10E3/UL
NRBC BLD AUTO-RTO: 0 /100
P AXIS - MUSE: 84 DEGREES
PH UR STRIP: 6 [PH] (ref 5–7)
PLATELET # BLD AUTO: 220 10E3/UL (ref 150–450)
PLATELET # BLD AUTO: 226 10E3/UL (ref 150–450)
POTASSIUM SERPL-SCNC: 3.6 MMOL/L (ref 3.4–5.3)
POTASSIUM SERPL-SCNC: 3.9 MMOL/L (ref 3.4–5.3)
PR INTERVAL - MUSE: 168 MS
QRS DURATION - MUSE: 106 MS
QT - MUSE: 438 MS
QTC - MUSE: 465 MS
R AXIS - MUSE: -28 DEGREES
RBC # BLD AUTO: 3.77 10E6/UL (ref 3.8–5.2)
RBC # BLD AUTO: 3.86 10E6/UL (ref 3.8–5.2)
RBC URINE: 3 /HPF
RSV RNA SPEC NAA+PROBE: NEGATIVE
SARS-COV-2 RNA RESP QL NAA+PROBE: NEGATIVE
SODIUM SERPL-SCNC: 139 MMOL/L (ref 135–145)
SODIUM SERPL-SCNC: 143 MMOL/L (ref 135–145)
SP GR UR STRIP: 1.02 (ref 1–1.03)
SQUAMOUS EPITHELIAL: 1 /HPF
SYSTOLIC BLOOD PRESSURE - MUSE: NORMAL MMHG
T AXIS - MUSE: 6 DEGREES
TROPONIN T SERPL HS-MCNC: 30 NG/L
TROPONIN T SERPL HS-MCNC: 31 NG/L
UROBILINOGEN UR STRIP-MCNC: NORMAL MG/DL
VENTRICULAR RATE- MUSE: 68 BPM
WBC # BLD AUTO: 11.7 10E3/UL (ref 4–11)
WBC # BLD AUTO: 9.6 10E3/UL (ref 4–11)
WBC CLUMPS #/AREA URNS HPF: PRESENT /HPF
WBC URINE: >182 /HPF

## 2025-08-11 PROCEDURE — G0378 HOSPITAL OBSERVATION PER HR: HCPCS

## 2025-08-11 PROCEDURE — 97530 THERAPEUTIC ACTIVITIES: CPT | Mod: GP | Performed by: PHYSICAL THERAPIST

## 2025-08-11 PROCEDURE — 99223 1ST HOSP IP/OBS HIGH 75: CPT | Mod: AI | Performed by: INTERNAL MEDICINE

## 2025-08-11 PROCEDURE — 96375 TX/PRO/DX INJ NEW DRUG ADDON: CPT

## 2025-08-11 PROCEDURE — 96365 THER/PROPH/DIAG IV INF INIT: CPT

## 2025-08-11 PROCEDURE — 93005 ELECTROCARDIOGRAM TRACING: CPT

## 2025-08-11 PROCEDURE — 84484 ASSAY OF TROPONIN QUANT: CPT | Performed by: EMERGENCY MEDICINE

## 2025-08-11 PROCEDURE — 96361 HYDRATE IV INFUSION ADD-ON: CPT

## 2025-08-11 PROCEDURE — 81001 URINALYSIS AUTO W/SCOPE: CPT | Performed by: EMERGENCY MEDICINE

## 2025-08-11 PROCEDURE — 87186 SC STD MICRODIL/AGAR DIL: CPT | Performed by: EMERGENCY MEDICINE

## 2025-08-11 PROCEDURE — 80048 BASIC METABOLIC PNL TOTAL CA: CPT | Performed by: INTERNAL MEDICINE

## 2025-08-11 PROCEDURE — 258N000003 HC RX IP 258 OP 636: Performed by: EMERGENCY MEDICINE

## 2025-08-11 PROCEDURE — 85018 HEMOGLOBIN: CPT | Performed by: INTERNAL MEDICINE

## 2025-08-11 PROCEDURE — 36415 COLL VENOUS BLD VENIPUNCTURE: CPT | Performed by: INTERNAL MEDICINE

## 2025-08-11 PROCEDURE — 250N000009 HC RX 250: Performed by: INTERNAL MEDICINE

## 2025-08-11 PROCEDURE — 99207 PR NO BILLABLE SERVICE THIS VISIT: CPT | Performed by: HOSPITALIST

## 2025-08-11 PROCEDURE — 97116 GAIT TRAINING THERAPY: CPT | Mod: GP | Performed by: PHYSICAL THERAPIST

## 2025-08-11 PROCEDURE — 97161 PT EVAL LOW COMPLEX 20 MIN: CPT | Mod: GP | Performed by: PHYSICAL THERAPIST

## 2025-08-11 PROCEDURE — 250N000011 HC RX IP 250 OP 636: Performed by: EMERGENCY MEDICINE

## 2025-08-11 PROCEDURE — 250N000013 HC RX MED GY IP 250 OP 250 PS 637: Performed by: INTERNAL MEDICINE

## 2025-08-11 PROCEDURE — 85004 AUTOMATED DIFF WBC COUNT: CPT | Performed by: EMERGENCY MEDICINE

## 2025-08-11 PROCEDURE — 250N000011 HC RX IP 250 OP 636: Performed by: INTERNAL MEDICINE

## 2025-08-11 PROCEDURE — 80048 BASIC METABOLIC PNL TOTAL CA: CPT | Performed by: EMERGENCY MEDICINE

## 2025-08-11 PROCEDURE — 87637 SARSCOV2&INF A&B&RSV AMP PRB: CPT | Performed by: EMERGENCY MEDICINE

## 2025-08-11 PROCEDURE — 36415 COLL VENOUS BLD VENIPUNCTURE: CPT | Performed by: EMERGENCY MEDICINE

## 2025-08-11 RX ORDER — METOPROLOL TARTRATE 25 MG/1
25 TABLET, FILM COATED ORAL ONCE
Status: COMPLETED | OUTPATIENT
Start: 2025-08-11 | End: 2025-08-11

## 2025-08-11 RX ORDER — POLYETHYLENE GLYCOL 3350 17 G/17G
17 POWDER, FOR SOLUTION ORAL DAILY
Status: DISCONTINUED | OUTPATIENT
Start: 2025-08-11 | End: 2025-08-14 | Stop reason: HOSPADM

## 2025-08-11 RX ORDER — CEFTRIAXONE 2 G/1
2 INJECTION, POWDER, FOR SOLUTION INTRAMUSCULAR; INTRAVENOUS ONCE
Status: COMPLETED | OUTPATIENT
Start: 2025-08-11 | End: 2025-08-11

## 2025-08-11 RX ORDER — AMOXICILLIN 250 MG
1 CAPSULE ORAL 2 TIMES DAILY PRN
Status: DISCONTINUED | OUTPATIENT
Start: 2025-08-11 | End: 2025-08-14 | Stop reason: HOSPADM

## 2025-08-11 RX ORDER — NALOXONE HYDROCHLORIDE 0.4 MG/ML
0.4 INJECTION, SOLUTION INTRAMUSCULAR; INTRAVENOUS; SUBCUTANEOUS
Status: DISCONTINUED | OUTPATIENT
Start: 2025-08-11 | End: 2025-08-14 | Stop reason: HOSPADM

## 2025-08-11 RX ORDER — TELMISARTAN 80 MG/1
80 TABLET ORAL DAILY
COMMUNITY

## 2025-08-11 RX ORDER — ONDANSETRON 2 MG/ML
4 INJECTION INTRAMUSCULAR; INTRAVENOUS EVERY 6 HOURS PRN
Status: DISCONTINUED | OUTPATIENT
Start: 2025-08-11 | End: 2025-08-14 | Stop reason: HOSPADM

## 2025-08-11 RX ORDER — NALOXONE HYDROCHLORIDE 0.4 MG/ML
0.2 INJECTION, SOLUTION INTRAMUSCULAR; INTRAVENOUS; SUBCUTANEOUS
Status: DISCONTINUED | OUTPATIENT
Start: 2025-08-11 | End: 2025-08-14 | Stop reason: HOSPADM

## 2025-08-11 RX ORDER — ENALAPRILAT 1.25 MG/ML
2.5 INJECTION INTRAVENOUS ONCE
Status: COMPLETED | OUTPATIENT
Start: 2025-08-11 | End: 2025-08-11

## 2025-08-11 RX ORDER — ONDANSETRON 4 MG/1
4 TABLET, ORALLY DISINTEGRATING ORAL EVERY 6 HOURS PRN
Status: DISCONTINUED | OUTPATIENT
Start: 2025-08-11 | End: 2025-08-14 | Stop reason: HOSPADM

## 2025-08-11 RX ORDER — ACETAMINOPHEN 500 MG
1000 TABLET ORAL 3 TIMES DAILY
Status: DISCONTINUED | OUTPATIENT
Start: 2025-08-11 | End: 2025-08-14 | Stop reason: HOSPADM

## 2025-08-11 RX ORDER — PROCHLORPERAZINE MALEATE 5 MG/1
5 TABLET ORAL EVERY 6 HOURS PRN
Status: DISCONTINUED | OUTPATIENT
Start: 2025-08-11 | End: 2025-08-14 | Stop reason: HOSPADM

## 2025-08-11 RX ORDER — CEFTRIAXONE 1 G/1
1 INJECTION, POWDER, FOR SOLUTION INTRAMUSCULAR; INTRAVENOUS EVERY 24 HOURS
Status: DISCONTINUED | OUTPATIENT
Start: 2025-08-12 | End: 2025-08-13

## 2025-08-11 RX ORDER — IBUPROFEN 200 MG
400 TABLET ORAL EVERY 6 HOURS PRN
COMMUNITY

## 2025-08-11 RX ORDER — AMOXICILLIN 250 MG
2 CAPSULE ORAL 2 TIMES DAILY PRN
Status: DISCONTINUED | OUTPATIENT
Start: 2025-08-11 | End: 2025-08-14 | Stop reason: HOSPADM

## 2025-08-11 RX ADMIN — ACETAMINOPHEN 1000 MG: 500 TABLET, FILM COATED ORAL at 13:46

## 2025-08-11 RX ADMIN — CEFTRIAXONE 2 G: 2 INJECTION, POWDER, FOR SOLUTION INTRAMUSCULAR; INTRAVENOUS at 02:42

## 2025-08-11 RX ADMIN — METOPROLOL TARTRATE 25 MG: 25 TABLET, FILM COATED ORAL at 06:15

## 2025-08-11 RX ADMIN — ACETAMINOPHEN 1000 MG: 500 TABLET, FILM COATED ORAL at 08:51

## 2025-08-11 RX ADMIN — ENALAPRILAT 2.5 MG: 1.25 INJECTION INTRAVENOUS at 06:33

## 2025-08-11 RX ADMIN — POLYETHYLENE GLYCOL 3350 17 G: 17 POWDER, FOR SOLUTION ORAL at 08:51

## 2025-08-11 RX ADMIN — ACETAMINOPHEN 1000 MG: 500 TABLET, FILM COATED ORAL at 21:44

## 2025-08-11 RX ADMIN — ONDANSETRON 4 MG: 2 INJECTION, SOLUTION INTRAMUSCULAR; INTRAVENOUS at 08:49

## 2025-08-11 RX ADMIN — SODIUM CHLORIDE 1000 ML: 0.9 INJECTION, SOLUTION INTRAVENOUS at 01:00

## 2025-08-11 ASSESSMENT — ACTIVITIES OF DAILY LIVING (ADL)
ADLS_ACUITY_SCORE: 55
ADLS_ACUITY_SCORE: 55
ADLS_ACUITY_SCORE: 52
ADLS_ACUITY_SCORE: 47
ADLS_ACUITY_SCORE: 41
ADLS_ACUITY_SCORE: 55
ADLS_ACUITY_SCORE: 47
ADLS_ACUITY_SCORE: 53
ADLS_ACUITY_SCORE: 47
ADLS_ACUITY_SCORE: 52
ADLS_ACUITY_SCORE: 55
ADLS_ACUITY_SCORE: 55
ADLS_ACUITY_SCORE: 52
ADLS_ACUITY_SCORE: 55
DEPENDENT_IADLS:: MEAL PREPARATION
ADLS_ACUITY_SCORE: 55
ADLS_ACUITY_SCORE: 55
ADLS_ACUITY_SCORE: 47
ADLS_ACUITY_SCORE: 55
ADLS_ACUITY_SCORE: 55
ADLS_ACUITY_SCORE: 47
ADLS_ACUITY_SCORE: 55

## 2025-08-11 ASSESSMENT — COLUMBIA-SUICIDE SEVERITY RATING SCALE - C-SSRS
1. IN THE PAST MONTH, HAVE YOU WISHED YOU WERE DEAD OR WISHED YOU COULD GO TO SLEEP AND NOT WAKE UP?: YES
2. HAVE YOU ACTUALLY HAD ANY THOUGHTS OF KILLING YOURSELF IN THE PAST MONTH?: NO
6. HAVE YOU EVER DONE ANYTHING, STARTED TO DO ANYTHING, OR PREPARED TO DO ANYTHING TO END YOUR LIFE?: NO

## 2025-08-12 LAB — BACTERIA UR CULT: ABNORMAL

## 2025-08-12 PROCEDURE — G0378 HOSPITAL OBSERVATION PER HR: HCPCS

## 2025-08-12 PROCEDURE — 99232 SBSQ HOSP IP/OBS MODERATE 35: CPT | Performed by: INTERNAL MEDICINE

## 2025-08-12 PROCEDURE — 250N000013 HC RX MED GY IP 250 OP 250 PS 637: Performed by: INTERNAL MEDICINE

## 2025-08-12 PROCEDURE — 250N000011 HC RX IP 250 OP 636: Performed by: INTERNAL MEDICINE

## 2025-08-12 PROCEDURE — 96376 TX/PRO/DX INJ SAME DRUG ADON: CPT

## 2025-08-12 RX ORDER — AMLODIPINE BESYLATE 5 MG/1
5 TABLET ORAL DAILY
Status: DISCONTINUED | OUTPATIENT
Start: 2025-08-12 | End: 2025-08-13

## 2025-08-12 RX ORDER — LOSARTAN POTASSIUM 100 MG/1
100 TABLET ORAL DAILY
Status: DISCONTINUED | OUTPATIENT
Start: 2025-08-12 | End: 2025-08-14 | Stop reason: HOSPADM

## 2025-08-12 RX ORDER — METOPROLOL SUCCINATE 50 MG/1
50 TABLET, EXTENDED RELEASE ORAL DAILY
Status: DISCONTINUED | OUTPATIENT
Start: 2025-08-12 | End: 2025-08-13

## 2025-08-12 RX ORDER — FAMOTIDINE 10 MG
10 TABLET ORAL DAILY
Status: DISCONTINUED | OUTPATIENT
Start: 2025-08-12 | End: 2025-08-14 | Stop reason: HOSPADM

## 2025-08-12 RX ORDER — FLUOXETINE 10 MG/1
20 CAPSULE ORAL EVERY EVENING
Status: DISCONTINUED | OUTPATIENT
Start: 2025-08-12 | End: 2025-08-14 | Stop reason: HOSPADM

## 2025-08-12 RX ORDER — ATORVASTATIN CALCIUM 10 MG/1
10 TABLET, FILM COATED ORAL DAILY
Status: DISCONTINUED | OUTPATIENT
Start: 2025-08-12 | End: 2025-08-14 | Stop reason: HOSPADM

## 2025-08-12 RX ADMIN — FAMOTIDINE 10 MG: 10 TABLET ORAL at 09:00

## 2025-08-12 RX ADMIN — ACETAMINOPHEN 1000 MG: 500 TABLET, FILM COATED ORAL at 14:39

## 2025-08-12 RX ADMIN — POLYETHYLENE GLYCOL 3350 17 G: 17 POWDER, FOR SOLUTION ORAL at 07:48

## 2025-08-12 RX ADMIN — ACETAMINOPHEN 1000 MG: 500 TABLET, FILM COATED ORAL at 20:08

## 2025-08-12 RX ADMIN — AMLODIPINE BESYLATE 5 MG: 5 TABLET ORAL at 14:39

## 2025-08-12 RX ADMIN — ATORVASTATIN CALCIUM 10 MG: 10 TABLET, FILM COATED ORAL at 09:00

## 2025-08-12 RX ADMIN — FLUOXETINE 20 MG: 10 CAPSULE ORAL at 20:07

## 2025-08-12 RX ADMIN — CEFTRIAXONE 1 G: 1 INJECTION, POWDER, FOR SOLUTION INTRAMUSCULAR; INTRAVENOUS at 02:26

## 2025-08-12 RX ADMIN — ACETAMINOPHEN 1000 MG: 500 TABLET, FILM COATED ORAL at 07:48

## 2025-08-12 RX ADMIN — LOSARTAN POTASSIUM 100 MG: 100 TABLET, FILM COATED ORAL at 09:00

## 2025-08-12 ASSESSMENT — ACTIVITIES OF DAILY LIVING (ADL)
ADLS_ACUITY_SCORE: 52

## 2025-08-13 PROCEDURE — 250N000013 HC RX MED GY IP 250 OP 250 PS 637: Performed by: INTERNAL MEDICINE

## 2025-08-13 PROCEDURE — G0378 HOSPITAL OBSERVATION PER HR: HCPCS

## 2025-08-13 PROCEDURE — 250N000011 HC RX IP 250 OP 636: Performed by: INTERNAL MEDICINE

## 2025-08-13 PROCEDURE — 97116 GAIT TRAINING THERAPY: CPT | Mod: GP | Performed by: PHYSICAL THERAPIST

## 2025-08-13 PROCEDURE — 99232 SBSQ HOSP IP/OBS MODERATE 35: CPT | Performed by: INTERNAL MEDICINE

## 2025-08-13 PROCEDURE — 97530 THERAPEUTIC ACTIVITIES: CPT | Mod: GP | Performed by: PHYSICAL THERAPIST

## 2025-08-13 PROCEDURE — 96376 TX/PRO/DX INJ SAME DRUG ADON: CPT

## 2025-08-13 RX ORDER — AMLODIPINE BESYLATE 10 MG/1
10 TABLET ORAL DAILY
Qty: 30 TABLET | Refills: 0 | Status: SHIPPED | OUTPATIENT
Start: 2025-08-14 | End: 2025-09-13

## 2025-08-13 RX ORDER — CIPROFLOXACIN 250 MG/1
250 TABLET, FILM COATED ORAL EVERY 12 HOURS
Qty: 10 TABLET | Refills: 0 | Status: SHIPPED | OUTPATIENT
Start: 2025-08-13 | End: 2025-08-18

## 2025-08-13 RX ORDER — AMLODIPINE BESYLATE 5 MG/1
5 TABLET ORAL ONCE
Status: COMPLETED | OUTPATIENT
Start: 2025-08-13 | End: 2025-08-13

## 2025-08-13 RX ORDER — CIPROFLOXACIN 250 MG/1
250 TABLET, FILM COATED ORAL EVERY 12 HOURS SCHEDULED
Status: DISCONTINUED | OUTPATIENT
Start: 2025-08-13 | End: 2025-08-14 | Stop reason: HOSPADM

## 2025-08-13 RX ORDER — AMLODIPINE BESYLATE 10 MG/1
10 TABLET ORAL DAILY
Status: DISCONTINUED | OUTPATIENT
Start: 2025-08-14 | End: 2025-08-14 | Stop reason: HOSPADM

## 2025-08-13 RX ADMIN — AMLODIPINE BESYLATE 5 MG: 5 TABLET ORAL at 07:47

## 2025-08-13 RX ADMIN — ACETAMINOPHEN 1000 MG: 500 TABLET, FILM COATED ORAL at 14:35

## 2025-08-13 RX ADMIN — CEFTRIAXONE 1 G: 1 INJECTION, POWDER, FOR SOLUTION INTRAMUSCULAR; INTRAVENOUS at 01:17

## 2025-08-13 RX ADMIN — ATORVASTATIN CALCIUM 10 MG: 10 TABLET, FILM COATED ORAL at 07:46

## 2025-08-13 RX ADMIN — AMLODIPINE BESYLATE 5 MG: 5 TABLET ORAL at 09:26

## 2025-08-13 RX ADMIN — ACETAMINOPHEN 1000 MG: 500 TABLET, FILM COATED ORAL at 19:33

## 2025-08-13 RX ADMIN — FAMOTIDINE 10 MG: 10 TABLET ORAL at 07:46

## 2025-08-13 RX ADMIN — LOSARTAN POTASSIUM 100 MG: 100 TABLET, FILM COATED ORAL at 07:46

## 2025-08-13 RX ADMIN — CIPROFLOXACIN HYDROCHLORIDE 250 MG: 250 TABLET, FILM COATED ORAL at 09:26

## 2025-08-13 RX ADMIN — FLUOXETINE 20 MG: 10 CAPSULE ORAL at 19:33

## 2025-08-13 RX ADMIN — CIPROFLOXACIN HYDROCHLORIDE 250 MG: 250 TABLET, FILM COATED ORAL at 19:33

## 2025-08-13 RX ADMIN — ACETAMINOPHEN 1000 MG: 500 TABLET, FILM COATED ORAL at 07:46

## 2025-08-13 RX ADMIN — POLYETHYLENE GLYCOL 3350 17 G: 17 POWDER, FOR SOLUTION ORAL at 07:46

## 2025-08-13 ASSESSMENT — ACTIVITIES OF DAILY LIVING (ADL)
ADLS_ACUITY_SCORE: 52
ADLS_ACUITY_SCORE: 54
ADLS_ACUITY_SCORE: 52
ADLS_ACUITY_SCORE: 54
ADLS_ACUITY_SCORE: 52
ADLS_ACUITY_SCORE: 54

## 2025-08-14 ENCOUNTER — DOCUMENTATION ONLY (OUTPATIENT)
Dept: GERIATRICS | Facility: CLINIC | Age: OVER 89
End: 2025-08-14
Payer: MEDICARE

## 2025-08-14 VITALS
HEART RATE: 87 BPM | RESPIRATION RATE: 18 BRPM | SYSTOLIC BLOOD PRESSURE: 161 MMHG | DIASTOLIC BLOOD PRESSURE: 73 MMHG | TEMPERATURE: 97.9 F | WEIGHT: 115.9 LBS | OXYGEN SATURATION: 94 % | BODY MASS INDEX: 21.54 KG/M2

## 2025-08-14 PROCEDURE — 250N000013 HC RX MED GY IP 250 OP 250 PS 637: Performed by: INTERNAL MEDICINE

## 2025-08-14 PROCEDURE — 99239 HOSP IP/OBS DSCHRG MGMT >30: CPT | Performed by: NURSE PRACTITIONER

## 2025-08-14 PROCEDURE — G0378 HOSPITAL OBSERVATION PER HR: HCPCS

## 2025-08-14 RX ADMIN — CIPROFLOXACIN HYDROCHLORIDE 250 MG: 250 TABLET, FILM COATED ORAL at 08:57

## 2025-08-14 RX ADMIN — POLYETHYLENE GLYCOL 3350 17 G: 17 POWDER, FOR SOLUTION ORAL at 08:57

## 2025-08-14 RX ADMIN — AMLODIPINE BESYLATE 10 MG: 10 TABLET ORAL at 08:56

## 2025-08-14 RX ADMIN — ATORVASTATIN CALCIUM 10 MG: 10 TABLET, FILM COATED ORAL at 08:56

## 2025-08-14 RX ADMIN — LOSARTAN POTASSIUM 100 MG: 100 TABLET, FILM COATED ORAL at 08:57

## 2025-08-14 RX ADMIN — FAMOTIDINE 10 MG: 10 TABLET ORAL at 08:56

## 2025-08-14 RX ADMIN — ACETAMINOPHEN 1000 MG: 500 TABLET, FILM COATED ORAL at 08:56

## 2025-08-14 ASSESSMENT — ACTIVITIES OF DAILY LIVING (ADL)
ADLS_ACUITY_SCORE: 54

## (undated) DEVICE — SU MONOCRYL 0 CT-2 27" Y334H

## (undated) DEVICE — DRAPE VAGI BAG 18X9" 1072

## (undated) DEVICE — SPONGE RAY-TEC 3X3" 30-094

## (undated) DEVICE — GLOVE PROTEXIS BLUE W/NEU-THERA 6.0  2D73EB60

## (undated) DEVICE — SPONGE PACK VAGINAL 2"X9

## (undated) DEVICE — LINEN ORTHO ACL PACK 5447

## (undated) DEVICE — SYR 10ML FINGER CONTROL W/O NDL 309695

## (undated) DEVICE — DAVINCI XI DRAPE COLUMN 470341

## (undated) DEVICE — EVAC SYSTEM CLEAR FLOW SC082500

## (undated) DEVICE — KIT PATIENT POSITIONING PIGAZZI LATEX FREE 40580

## (undated) DEVICE — TUBING SET 1 LUMEN FILTER STAND INSUFFLATION MODE 0.27 UM SI

## (undated) DEVICE — NDL INSUFFLATION 13GA 120MM C2201

## (undated) DEVICE — SOL NACL 0.9% INJ 1000ML BAG 2B1324X

## (undated) DEVICE — DRAPE MAYO STAND 23X54 8337

## (undated) DEVICE — Device

## (undated) DEVICE — DAVINCI OBTURATOR 8MM BLADELESS 420023

## (undated) DEVICE — GLOVE PROTEXIS POWDER FREE 6.5 ORTHOPEDIC 2D73ET65

## (undated) DEVICE — DAVINCI XI SEAL UNIVERSAL 5-8MM 470361

## (undated) DEVICE — SU MONOCRYL 3-0 PS-2 27" Y427H

## (undated) DEVICE — TUBING IRRIG TUR Y TYPE 96" LF 6543-01

## (undated) DEVICE — SUCTION MANIFOLD NEPTUNE 2 SYS 4 PORT 0702-020-000

## (undated) DEVICE — DAVINCI HOT SHEARS TIP COVER  400180

## (undated) DEVICE — SUCTION IRR STRYKERFLOW II W/TIP 250-070-520

## (undated) DEVICE — DRAPE UNDER BUTTOCK 89415

## (undated) DEVICE — SOL WATER IRRIG 3000ML BAG 2B7117

## (undated) DEVICE — PACK DAVINCI UROLOGY SBA15UDFSG

## (undated) DEVICE — SU VICRYL 0 CT-1 27" J340H

## (undated) DEVICE — DAVINCI XI OBTURATOR BLADELESS 8MM 470359

## (undated) DEVICE — ESU GROUND PAD ADULT W/CORD E7507

## (undated) DEVICE — DAVINCI XI DRAPE ARM 470015

## (undated) DEVICE — PROTECTOR ARM ONE-STEP TRENDELENBURG 40418

## (undated) DEVICE — ADH SKIN CLOSURE PREMIERPRO EXOFIN MICOR HV 0.5ML 3471

## (undated) DEVICE — SPONGE RAY-TEC 4X8" 7318

## (undated) DEVICE — CATH FOLEY 16FR 5ML LUBRICATH LATEX 0165L16

## (undated) DEVICE — SU VICRYL 2-0 CT-2 27" J333H

## (undated) DEVICE — DAVINCI S CANNULA SEAL 8MM 400077

## (undated) DEVICE — SU PDS II 2-0 CT-2 27"  Z333H

## (undated) RX ORDER — LIDOCAINE HYDROCHLORIDE 10 MG/ML
INJECTION, SOLUTION EPIDURAL; INFILTRATION; INTRACAUDAL; PERINEURAL
Status: DISPENSED
Start: 2021-10-11

## (undated) RX ORDER — DEXAMETHASONE SODIUM PHOSPHATE 4 MG/ML
INJECTION, SOLUTION INTRA-ARTICULAR; INTRALESIONAL; INTRAMUSCULAR; INTRAVENOUS; SOFT TISSUE
Status: DISPENSED
Start: 2021-10-11

## (undated) RX ORDER — PROPOFOL 10 MG/ML
INJECTION, EMULSION INTRAVENOUS
Status: DISPENSED
Start: 2021-10-11

## (undated) RX ORDER — BUPIVACAINE HYDROCHLORIDE 5 MG/ML
INJECTION, SOLUTION EPIDURAL; INTRACAUDAL
Status: DISPENSED
Start: 2021-10-11

## (undated) RX ORDER — LABETALOL HYDROCHLORIDE 5 MG/ML
INJECTION, SOLUTION INTRAVENOUS
Status: DISPENSED
Start: 2021-10-11

## (undated) RX ORDER — GLYCOPYRROLATE 0.2 MG/ML
INJECTION INTRAMUSCULAR; INTRAVENOUS
Status: DISPENSED
Start: 2021-10-11

## (undated) RX ORDER — ONDANSETRON 2 MG/ML
INJECTION INTRAMUSCULAR; INTRAVENOUS
Status: DISPENSED
Start: 2021-10-11

## (undated) RX ORDER — NEOSTIGMINE METHYLSULFATE 1 MG/ML
VIAL (ML) INJECTION
Status: DISPENSED
Start: 2021-10-11

## (undated) RX ORDER — LIDOCAINE HYDROCHLORIDE AND EPINEPHRINE 10; 10 MG/ML; UG/ML
INJECTION, SOLUTION INFILTRATION; PERINEURAL
Status: DISPENSED
Start: 2021-10-11

## (undated) RX ORDER — FENTANYL CITRATE-0.9 % NACL/PF 10 MCG/ML
PLASTIC BAG, INJECTION (ML) INTRAVENOUS
Status: DISPENSED
Start: 2021-10-11

## (undated) RX ORDER — HYDRALAZINE HYDROCHLORIDE 20 MG/ML
INJECTION INTRAMUSCULAR; INTRAVENOUS
Status: DISPENSED
Start: 2021-10-11

## (undated) RX ORDER — PHENAZOPYRIDINE HYDROCHLORIDE 200 MG/1
TABLET, FILM COATED ORAL
Status: DISPENSED
Start: 2021-10-11

## (undated) RX ORDER — ACETAMINOPHEN 325 MG/1
TABLET ORAL
Status: DISPENSED
Start: 2021-10-11

## (undated) RX ORDER — FENTANYL CITRATE 50 UG/ML
INJECTION, SOLUTION INTRAMUSCULAR; INTRAVENOUS
Status: DISPENSED
Start: 2021-10-11

## (undated) RX ORDER — CEFAZOLIN SODIUM/WATER 2 G/20 ML
SYRINGE (ML) INTRAVENOUS
Status: DISPENSED
Start: 2021-10-11